# Patient Record
Sex: MALE | Race: WHITE | NOT HISPANIC OR LATINO | Employment: FULL TIME | URBAN - METROPOLITAN AREA
[De-identification: names, ages, dates, MRNs, and addresses within clinical notes are randomized per-mention and may not be internally consistent; named-entity substitution may affect disease eponyms.]

---

## 2017-01-04 ENCOUNTER — OFFICE VISIT (OUTPATIENT)
Dept: MEDICAL GROUP | Facility: CLINIC | Age: 57
End: 2017-01-04
Payer: COMMERCIAL

## 2017-01-04 VITALS
BODY MASS INDEX: 35.21 KG/M2 | HEIGHT: 72 IN | OXYGEN SATURATION: 94 % | SYSTOLIC BLOOD PRESSURE: 120 MMHG | WEIGHT: 260 LBS | TEMPERATURE: 97.5 F | RESPIRATION RATE: 18 BRPM | HEART RATE: 83 BPM | DIASTOLIC BLOOD PRESSURE: 70 MMHG

## 2017-01-04 DIAGNOSIS — B34.9 VIRAL SYNDROME: ICD-10-CM

## 2017-01-04 PROCEDURE — 99213 OFFICE O/P EST LOW 20 MIN: CPT | Performed by: INTERNAL MEDICINE

## 2017-01-04 ASSESSMENT — PATIENT HEALTH QUESTIONNAIRE - PHQ9: CLINICAL INTERPRETATION OF PHQ2 SCORE: 0

## 2017-01-04 NOTE — MR AVS SNAPSHOT
"        Jose Wood Magdaleno   2017 3:00 PM   Office Visit   MRN: [id number]    Department:  [facility]   Dept Phone:  [phone]    Description:  Male : 1960   Provider:  David Martinez M.D.           Allergies as of 2017     Allergen Noted Reactions    Pcn [Penicillins] 2009         Vital Signs     Blood Pressure Pulse Temperature Respirations Height Weight    120/70 mmHg 83 36.4 °C (97.5 °F) 18 1.829 m (6' 0.01\") 117.935 kg (260 lb)    Body Mass Index Oxygen Saturation Smoking Status             35.25 kg/m2 94% Never Smoker          Basic Information     Date Of Birth Sex Race Ethnicity Preferred Language    1960 Male White Non- English      Problem List              ICD-10-CM Priority Class Noted - Resolved    Hypertension I10   Unknown - Present    Situational mixed anxiety and depressive disorder F43.23   2014 - Present    Hyperlipidemia E78.5   10/10/2014 - Present    Skin lesions, generalized L98.9   2015 - Present    Actinic keratosis L57.0   1/15/2015 - Present    Cough R05   3/1/2016 - Present    Vasomotor rhinitis J30.0   2016 - Present      Health Maintenance        Date Due Completion Dates    COLONOSCOPY 2011 ---    IMM INFLUENZA (1) 2015, 2014    IMM DTaP/Tdap/Td Vaccine (2 - Td) 2024            Current Immunizations     Influenza Vaccine Quad Inj (Pf) 2015    Influenza Vaccine Quad Inj (Preserved) 2014    Tdap Vaccine 2014      Below and/or attached are the medications your provider expects you to take. Review all of your home medications and newly ordered medications with your provider and/or pharmacist. Follow medication instructions as directed by your provider and/or pharmacist. Please keep your medication list with you and share with your provider. Update the information when medications are discontinued, doses are changed, or new medications (including over-the-counter products) are " added; and carry medication information at all times in the event of emergency situations     Allergies:  PCN - (reactions not documented)               Medications  Valid as of: January 04, 2017 -  3:30 PM    Generic Name Brand Name Tablet Size Instructions for use    Benzonatate (Cap) TESSALON 100 MG Take 1 Cap by mouth 3 times a day as needed for Cough.        BuPROPion HCl (Tab) WELLBUTRIN 75 MG Take 1 Tab by mouth 2 times a day.        Escitalopram Oxalate (Tab) LEXAPRO 20 MG Take 1 Tab by mouth every day.        Fluorouracil (Cream) EFUDEX 5 % Apply to face and forearm lesion twice per day for two weeks.        Fluticasone Propionate (Suspension) FLONASE 50 MCG/ACT Spray 1 Spray in nose every day.        HydroCHLOROthiazide (Tab) HYDRODIURIL 25 MG Take 1 Tab by mouth every day.        Lisinopril (Tab) PRINIVIL 10 MG Take 1 Tab by mouth every day. PT NEEDS TO ESTABLISH WITH A NEW PCP FOR FUTURE REFILLS 9/9/16        .                 Medicines prescribed today were sent to:     [facility] #[id number] - [place] - [address]    [address] [place] [address]    Phone: [phone] Fax: [phone]    Open 24 Hours?: No      Medication refill instructions:       If your prescription bottle indicates you have medication refills left, it is not necessary to call your provider’s office. Please contact your pharmacy and they will refill your medication.    If your prescription bottle indicates you do not have any refills left, you may request refills at any time through one of the following ways: The online [employer] system (except Urgent Care), by calling your provider’s office, or by asking your pharmacy to contact your provider’s office with a refill request. Medication refills are processed only during regular business hours and may not be available until the next business day. Your provider may request additional information or to have a follow-up visit with you prior to refilling your medication.      *Please Note: Medication refills are assigned a new Rx number when refilled electronically. Your pharmacy may indicate that no refills were authorized even though a new prescription for the same medication is available at the pharmacy. Please request the medicine by name with the pharmacy before contacting your provider for a refill.           MyChart Status: Patient Declined

## 2017-01-05 NOTE — PROGRESS NOTES
"Subjective:  Jose is a 56 y.o. male with the following   Past Medical History   Diagnosis Date   • HTN    • Depression    • Situational mixed anxiety and depressive disorder 1/13/2014   • Chest pain at rest 1/13/2014   • Hyperlipidemia 10/10/2014   • Cough 3/1/2016      Family History   Problem Relation Age of Onset   • Heart Disease Father      The patient is on the following medications:   Current outpatient prescriptions:   •  benzonatate (TESSALON) 100 MG Cap, Take 1 Cap by mouth 3 times a day as needed for Cough., Disp: 60 Cap, Rfl: 0  •  fluticasone (FLONASE) 50 MCG/ACT nasal spray, Spray 1 Spray in nose every day., Disp: 16 g, Rfl: 11  •  escitalopram (LEXAPRO) 20 MG tablet, Take 1 Tab by mouth every day., Disp: 90 Tab, Rfl: 1  •  buPROPion (WELLBUTRIN) 75 MG Tab, Take 1 Tab by mouth 2 times a day., Disp: 180 Tab, Rfl: 1  •  lisinopril (PRINIVIL) 10 MG Tab, Take 1 Tab by mouth every day. PT NEEDS TO ESTABLISH WITH A NEW PCP FOR FUTURE REFILLS 9/9/16, Disp: 90 Tab, Rfl: 1  •  hydrochlorothiazide (HYDRODIURIL) 25 MG Tab, Take 1 Tab by mouth every day., Disp: 90 Tab, Rfl: 1  •  fluorouracil (EFUDEX) 5 % cream, Apply to face and forearm lesion twice per day for two weeks., Disp: 1 Tube, Rfl: 0    HPI; patient is here today complaining of nasal congestion, rhinorrhea, dry cough and generalized ache for several days,  denies having shortness of breath, fever or chills  ROS:  See HPI    Blood pressure 120/70, pulse 83, temperature 36.4 °C (97.5 °F), resp. rate 18, height 1.829 m (6' 0.01\"), weight 117.935 kg (260 lb), SpO2 94 %.on RA  Objective:  Patient is well appearing and in no acute distress.  Pharynx is clear.  Neck is soft and supple with no cervical or supraclavicular lymphadenopathy, thyromegaly or masses, no JVD.  Lungs clear to auscultation bilaterally with normal respiratory effort. Abdomen soft, non-tender on palpation,not distended. Heart regular rate and rhythm without murmur. Extremities without " any clubbing, cyanosis, or edema.    Assessment and Plan:  1. Viral syndrome; nasal congestion, rhinorrhea/ postnasal drip and dry cough, generalized ache. Lungs are clear to auscultation, pulse ox 94% on room air, patient is not in apparent distress.      Patient is advised on preventive and supportive care regarding current symptoms, OTC alternative therapies, antihistamines, nasal sprays,... If symptoms not improved or worsen return for evaluation.      Please note that this dictation was created using voice recognition software. I have worked with consultants from the vendor as well as technical experts from [facility] to optimize the interface. I have made every reasonable attempt to correct obvious errors, but I expect that there are errors of grammar and possibly content that I did not discover before finalizing the note.

## 2017-04-20 ENCOUNTER — OFFICE VISIT (OUTPATIENT)
Dept: URGENT CARE | Facility: PHYSICIAN GROUP | Age: 57
End: 2017-04-20

## 2017-04-20 VITALS
DIASTOLIC BLOOD PRESSURE: 88 MMHG | HEART RATE: 72 BPM | TEMPERATURE: 97.1 F | WEIGHT: 258 LBS | SYSTOLIC BLOOD PRESSURE: 136 MMHG | OXYGEN SATURATION: 95 % | HEIGHT: 73 IN | BODY MASS INDEX: 34.19 KG/M2

## 2017-04-20 DIAGNOSIS — Z02.89 ENCOUNTER FOR EXAMINATION REQUIRED BY DEPARTMENT OF TRANSPORTATION (DOT): ICD-10-CM

## 2017-04-20 PROCEDURE — 7100 PR DOT PHYSICAL: Performed by: EMERGENCY MEDICINE

## 2017-04-21 NOTE — PROGRESS NOTES
Here for [facility] CMV medical examination. Form completed, 2 year certificate. Patient advised of positive heme in urine test, advised follow-up with PCP when available.

## 2017-06-12 RX ORDER — LISINOPRIL 10 MG/1
TABLET ORAL
Qty: 90 TAB | Refills: 3 | Status: SHIPPED | OUTPATIENT
Start: 2017-06-12 | End: 2018-06-29 | Stop reason: SDUPTHER

## 2017-06-12 NOTE — TELEPHONE ENCOUNTER
Received refill request for lisinopril.  Pt was seen in clinic recently and is taking this medication for hypertension. Refill sent to pharmacy    Jabari Kramer M.D.

## 2017-06-19 RX ORDER — HYDROCHLOROTHIAZIDE 25 MG/1
TABLET ORAL
Qty: 90 TAB | Refills: 3 | Status: SHIPPED | OUTPATIENT
Start: 2017-06-19 | End: 2018-04-04

## 2017-10-09 ENCOUNTER — OFFICE VISIT (OUTPATIENT)
Dept: MEDICAL GROUP | Facility: PHYSICIAN GROUP | Age: 57
End: 2017-10-09
Payer: COMMERCIAL

## 2017-10-09 VITALS
HEIGHT: 73 IN | OXYGEN SATURATION: 95 % | TEMPERATURE: 98.7 F | HEART RATE: 80 BPM | WEIGHT: 255 LBS | BODY MASS INDEX: 33.8 KG/M2 | SYSTOLIC BLOOD PRESSURE: 120 MMHG | RESPIRATION RATE: 16 BRPM | DIASTOLIC BLOOD PRESSURE: 78 MMHG

## 2017-10-09 DIAGNOSIS — F41.0 SEVERE ANXIETY WITH PANIC: ICD-10-CM

## 2017-10-09 DIAGNOSIS — E66.9 OBESITY (BMI 35.0-39.9 WITHOUT COMORBIDITY): ICD-10-CM

## 2017-10-09 DIAGNOSIS — E55.9 VITAMIN D DEFICIENCY: ICD-10-CM

## 2017-10-09 DIAGNOSIS — R07.89 OTHER CHEST PAIN: ICD-10-CM

## 2017-10-09 PROCEDURE — 93000 ELECTROCARDIOGRAM COMPLETE: CPT | Performed by: NURSE PRACTITIONER

## 2017-10-09 PROCEDURE — 99214 OFFICE O/P EST MOD 30 MIN: CPT | Mod: 25 | Performed by: NURSE PRACTITIONER

## 2017-10-09 RX ORDER — ERGOCALCIFEROL 1.25 MG/1
50000 CAPSULE ORAL
Refills: 2 | COMMUNITY
Start: 2017-09-13

## 2017-10-09 RX ORDER — LORAZEPAM 1 MG/1
1 TABLET ORAL EVERY 4 HOURS PRN
COMMUNITY
End: 2017-11-13 | Stop reason: SDUPTHER

## 2017-10-09 RX ORDER — ESCITALOPRAM OXALATE 20 MG/1
20 TABLET ORAL DAILY
Qty: 30 TAB | Refills: 0 | Status: SHIPPED | OUTPATIENT
Start: 2017-10-09 | End: 2017-11-05 | Stop reason: SDUPTHER

## 2017-10-09 ASSESSMENT — PAIN SCALES - GENERAL: PAINLEVEL: NO PAIN

## 2017-10-09 NOTE — ASSESSMENT & PLAN NOTE
Chronic in nature. Worsening. Patient continues to take Ativan 1 mg as needed for this condition and states he does not take this frequently as he has been told that it is habit forming. Patient is also taking sertraline 50 mg daily but states he isn't taking it for approximately 8 weeks without resolution or improvement of symptoms. Patient states he did take Lexapro in the past with good results. Patient symptoms mainly include atypical chest pain, shortness of breath, panic patient states that he will become incredibly overwhelmed, irritated, snapping at people, has difficulty sleeping. 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 Patient has bloodshot eyes and appears exhausted. 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

## 2017-10-09 NOTE — PROGRESS NOTES
Chief Complaint   Patient presents with   • Chest Pain     x 2 weeks R side    • Anxiety       HISTORY OF PRESENT ILLNESS: Patient is a 57 y.o. male established patient who presents today toDiscuss chest pain, anxiety.    Other chest pain  This ia a new problem over the last 2 weeks. States that he having an aching pain in his right chest. States that it feels like an aching pressure. States that it will get worse with deep breaths. Pain will sometimes last all day dull ache, and radiates up to the shoulder, has improved. 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 No numbness, tingling, no increased SOB.  States he is having issues with panic episodes. Patient states he does have some burning in epigastric area after eating. States that he is currently taking zoloft and ativan. Previously he was on lexapro, which did seem to work better. Most severe in morning when he wakes up. Drinks alcohol rarely.     Severe anxiety with panic  Chronic in nature. Worsening. Patient continues to take Ativan 1 mg as needed for this condition and states he does not take this frequently as he has been told that it is habit forming. Patient is also taking sertraline 50 mg daily but states he isn't taking it for approximately 8 weeks without resolution or improvement of symptoms. Patient states he did take Lexapro in the past with good results. Patient symptoms mainly include atypical chest pain, shortness of breath, panic patient states that he will become incredibly overwhelmed, irritated, snapping at people, has difficulty sleeping. 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 Patient has bloodshot eyes and appears exhausted. 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      Patient Active Problem List    Diagnosis Date Noted   • Other chest pain 10/09/2017   • Vitamin D deficiency 10/09/2017   • Obesity (BMI 35.0-39.9 without comorbidity) ([facility]) 10/09/2017   • Vasomotor rhinitis 2016   • Cough 2016   • Actinic keratosis 01/15/2015   • Skin lesions, generalized 2015   • Hyperlipidemia 10/10/2014   • Severe anxiety with panic 2014   • Hypertension        Allergies:Pcn [penicillins]    Current Outpatient Prescriptions   Medication Sig Dispense Refill   • lorazepam (ATIVAN) 1 MG Tab Take 1 mg by mouth every four hours as needed for Anxiety.     • escitalopram (LEXAPRO) 20 MG tablet Take 1 Tab by mouth every day. 30 Tab 0   • hydrochlorothiazide (HYDRODIURIL) 25 MG Tab TAKE 1 TAB BY MOUTH EVERY DAY. 90 Tab 3   • lisinopril (PRINIVIL) 10 MG Tab TAKE 1 TABLET BY MOUTH EVERY DAY 90 Tab 3   • vitamin D, Ergocalciferol, (DRISDOL) 68043 units Cap capsule Take 50,000 Units by mouth every 7 days.  2   • fluorouracil (EFUDEX) 5 % cream Apply to face and forearm lesion twice per day for two weeks. 1 Tube 0     No current facility-administered medications for this visit.        Social History   Substance Use Topics   • Smoking status: Never Smoker   • Smokeless tobacco: Never Used   • Alcohol use 1.0 oz/week     1 Glasses of wine, 1 Cans of beer per week      Comment: Occasionally her       Family Status   Relation Status   • Mother    • Father      Family History   Problem Relation Age of Onset   • Heart Disease Father        Review of Systems:   Constitutional:  Negative for fever, chills, weight loss and malaise/fatigue.   HEENT:  Negative for ear pain, nosebleeds, congestion, sore throat and neck pain.    Eyes:  Negative for blurred vision.   Respiratory:  Negative for cough, sputum production, shortness of breath and wheezing.    Cardiovascular:  Negative for chest pain, palpitations, orthopnea and leg swelling.  "  Gastrointestinal:  Negative for heartburn, nausea, vomiting and abdominal pain.   Genitourinary:  Negative for dysuria, urgency and frequency.   Musculoskeletal:  Negative for myalgias, back pain and joint pain.   Skin:  Negative for rash and itching.   Neurological:  Negative for dizziness, tingling, tremors, sensory change, focal weakness and headaches.   Endo/Heme/Allergies:  Does not bruise/bleed easily.   Psychiatric/Behavioral:  Negative for depression, suicidal ideas and memory loss.  The patient is nervous/anxious and does not have insomnia.    All other systems reviewed and are negative except as in HPI.    Exam:  Blood pressure 120/78, pulse 80, temperature 37.1 °C (98.7 °F), resp. rate 16, height 1.854 m (6' 1\"), weight 115.7 kg (255 lb), SpO2 95 %.  General:  Normal appearing. No distress.  HEENT:  Normocephalic. Eyes bloodshot conjunctiva clear lids without ptosis, pupils equal and reactive to light accommodation, ears normal shape and contour, canals are clear bilaterally, tympanic membranes are benign, nasal mucosa benign, oropharynx is without erythema, edema or exudates.   Neck:  Supple without JVD or bruit. Thyroid is not enlarged.  Pulmonary:  Clear to ausculation.  Normal effort. No rales, ronchi, or wheezing.  Cardiovascular:  Regular rate and rhythm without murmur. Carotid and radial pulses are intact and equal bilaterally.  Abdomen:  Soft, nontender, nondistended. Normal bowel sounds. Liver and spleen are not palpable  Neurologic:  Grossly nonfocal  Lymph:  No cervical, supraclavicular or axillary lymph nodes are palpable  Skin:  Warm and dry.  No obvious lesions.  Musculoskeletal:  Normal gait. No extremity cyanosis, clubbing, or edema.  Psych:  Normal mood and affect. Alert and oriented x3. Judgment and insight is normal.    EKG Interpretation   Interpreted by me   Rhythm: normal sinus   Rate: normal   Conduction: normal   ST Segments: no acute change   T Waves: no acute change   Q Waves: " none   Clinical Impression: no acute changes and normal EKG    PLAN:    1. Other chest pain  Pain is likely related to anxiety.  - EKG - Clinic Performed    2. Vitamin D deficiency  Will attempt to obtain recent lab results.    3. Obesity (BMI 35.0-39.9 without comorbidity)  Counseled patient regarding healthy diet and exercise.  - Patient identified as having weight management issue.  Appropriate orders and counseling given.    4. Severe anxiety with panic  Patient will stop Zoloft and start Lexapro.  - escitalopram (LEXAPRO) 20 MG tablet; Take 1 Tab by mouth every day.  Dispense: 30 Tab; Refill: 0    Follow-up in one month. Patient is encouraged to be seen in the emergency room for chest pain, palpitations, shortness of breath, dizziness, severe abdominal pain or other concerning symptoms.    Please note that this dictation was created using voice recognition software. I have made every reasonable attempt to correct obvious errors, but I expect that there are errors of grammar and possibly content that I did not discover before finalizing the note.    Assessment/Plan:  1. Other chest pain  EKG - Clinic Performed   2. Vitamin D deficiency     3. Obesity (BMI 35.0-39.9 without comorbidity)  Patient identified as having weight management issue.  Appropriate orders and counseling given.   4. Severe anxiety with panic  escitalopram (LEXAPRO) 20 MG tablet          I have placed the below orders and discussed them with an approved delegating provider. The MA is performing the below orders under the direction of Dr. Falcon.

## 2017-10-09 NOTE — ASSESSMENT & PLAN NOTE
This ia a new problem over the last 2 weeks. States that he having an aching pain in his right chest. States that it feels like an aching pressure. States that it will get worse with deep breaths. Pain will sometimes last all day dull ache, and radiates up to the shoulder, has improved. 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 No numbness, tingling, no increased SOB.  States he is having issues with panic episodes. Patient states he does have some burning in epigastric area after eating. States that he is currently taking zoloft and ativan. Previously he was on lexapro, which did seem to work better. Most severe in morning when he wakes up. Drinks alcohol rarely.

## 2017-11-05 DIAGNOSIS — F41.0 SEVERE ANXIETY WITH PANIC: ICD-10-CM

## 2017-11-06 RX ORDER — ESCITALOPRAM OXALATE 20 MG/1
TABLET ORAL
Qty: 30 TAB | Refills: 5 | Status: SHIPPED | OUTPATIENT
Start: 2017-11-06 | End: 2018-05-07 | Stop reason: SDUPTHER

## 2017-11-13 ENCOUNTER — OFFICE VISIT (OUTPATIENT)
Dept: MEDICAL GROUP | Facility: PHYSICIAN GROUP | Age: 57
End: 2017-11-13
Payer: COMMERCIAL

## 2017-11-13 VITALS
OXYGEN SATURATION: 98 % | DIASTOLIC BLOOD PRESSURE: 82 MMHG | BODY MASS INDEX: 33.72 KG/M2 | SYSTOLIC BLOOD PRESSURE: 120 MMHG | RESPIRATION RATE: 16 BRPM | TEMPERATURE: 98.7 F | HEIGHT: 73 IN | WEIGHT: 254.4 LBS | HEART RATE: 79 BPM

## 2017-11-13 DIAGNOSIS — F41.9 ANXIETY AND DEPRESSION: ICD-10-CM

## 2017-11-13 DIAGNOSIS — E66.9 OBESITY (BMI 30-39.9): ICD-10-CM

## 2017-11-13 DIAGNOSIS — I10 ESSENTIAL HYPERTENSION: ICD-10-CM

## 2017-11-13 DIAGNOSIS — F32.A ANXIETY AND DEPRESSION: ICD-10-CM

## 2017-11-13 DIAGNOSIS — Z23 INFLUENZA VACCINE NEEDED: ICD-10-CM

## 2017-11-13 PROCEDURE — 99214 OFFICE O/P EST MOD 30 MIN: CPT | Mod: 25 | Performed by: INTERNAL MEDICINE

## 2017-11-13 PROCEDURE — 90686 IIV4 VACC NO PRSV 0.5 ML IM: CPT | Performed by: INTERNAL MEDICINE

## 2017-11-13 PROCEDURE — 90471 IMMUNIZATION ADMIN: CPT | Performed by: INTERNAL MEDICINE

## 2017-11-13 RX ORDER — LORAZEPAM 1 MG/1
1 TABLET ORAL
Qty: 25 TAB | Refills: 1 | Status: SHIPPED | OUTPATIENT
Start: 2017-11-13 | End: 2018-04-04

## 2017-11-14 NOTE — PROGRESS NOTES
"Subjective:  Jose is a 57 y.o. male with the following   Past Medical History:   Diagnosis Date   • Chest pain at rest 1/13/2014   • Cough 3/1/2016   • Depression    • HTN    • Hyperlipidemia 10/10/2014   • Situational mixed anxiety and depressive disorder 1/13/2014      Family History   Problem Relation Age of Onset   • Heart Disease Father      The patient is on the following medications:   Current Outpatient Prescriptions:   •  escitalopram (LEXAPRO) 20 MG tablet, TAKE 1 TABLET BY MOUTH EVERY DAY., Disp: 30 Tab, Rfl: 5  •  lorazepam (ATIVAN) 1 MG Tab, Take 1 mg by mouth every four hours as needed for Anxiety., Disp: , Rfl:   •  hydrochlorothiazide (HYDRODIURIL) 25 MG Tab, TAKE 1 TAB BY MOUTH EVERY DAY., Disp: 90 Tab, Rfl: 3  •  lisinopril (PRINIVIL) 10 MG Tab, TAKE 1 TABLET BY MOUTH EVERY DAY, Disp: 90 Tab, Rfl: 3  •  vitamin D, Ergocalciferol, (DRISDOL) 23782 units Cap capsule, Take 50,000 Units by mouth every 7 days., Disp: , Rfl: 2    HPI; Patient is here today for follow-up visit after being evaluated for  anxiety and depressive symptoms, stating that taking Lexapro and lorazepam has significantly improved his condition, denies any suicidal ideation. Patient is  also on hydrochlorothiazide and lisinopril for hypertension and vitamin D supplements for vitamin D deficiency, denies having dry cough, chest pain or shortness of breath, urinary frequency or back pain.   ROS:  See HPI    Blood pressure 120/82, pulse 79, temperature 37.1 °C (98.7 °F), resp. rate 16, height 1.854 m (6' 1\"), weight 115.4 kg (254 lb 6.4 oz), SpO2 98 %.on RA  Objective:  Patient is well appearing and in no acute distress.  Pharynx is clear.  Neck is soft and supple with no cervical or supraclavicular lymphadenopathy, thyromegaly or masses, no JVD.  Lungs clear to auscultation bilaterally with normal respiratory effort. Abdomen soft, non-tender on palpation,not distended. Heart regular rate and rhythm without murmur. Extremities without " any clubbing, cyanosis, or edema.    Assessment and Plan:  1. HTN; BP is  stable on lisinopril 10 mg and hydrochlorothiazide 25 mg daily.       2. Anxiety/ depression; it has responded to Lexapro 20 mrem daily and lorazepam 1 mg as needed.      3. Increased BMI      4. Request of influenza vaccine      Patient is advised on preventive and supportive care, diet and lifestyle modification, daily walking ,exercise and weight loss, refilled medication. Given influenza vaccine per patient's request.       Please note that this dictation was created using voice recognition software. I have worked with consultants from the vendor as well as technical experts from [facility] to optimize the interface. I have made every reasonable attempt to correct obvious errors, but I expect that there are errors of grammar and possibly content that I did not discover before finalizing the note.

## 2018-02-21 ENCOUNTER — TELEPHONE (OUTPATIENT)
Dept: MEDICAL GROUP | Facility: PHYSICIAN GROUP | Age: 58
End: 2018-02-21

## 2018-02-21 NOTE — TELEPHONE ENCOUNTER
Phone Number Called: [phone]    Message: LVM informing patient an appt is required for this refill and he may call central scheduling at [phone] to make an appt for refill and schedule a new patient appt with new pcp.     Left Message for patient to call back: yes

## 2018-02-21 NOTE — TELEPHONE ENCOUNTER
VOICEMAIL  1. Caller Name: Jose                Call Back Number: [phone] (cell)    2. Message: Patient LVM requesting refill for Ativan.     3. Patient approves office to leave a detailed voicemail/MyChart message: yes

## 2018-04-04 ENCOUNTER — OFFICE VISIT (OUTPATIENT)
Dept: MEDICAL GROUP | Facility: MEDICAL CENTER | Age: 58
End: 2018-04-04
Payer: COMMERCIAL

## 2018-04-04 VITALS
HEART RATE: 89 BPM | SYSTOLIC BLOOD PRESSURE: 128 MMHG | HEIGHT: 72 IN | DIASTOLIC BLOOD PRESSURE: 78 MMHG | TEMPERATURE: 99.2 F | RESPIRATION RATE: 20 BRPM | WEIGHT: 260.14 LBS | BODY MASS INDEX: 35.24 KG/M2 | OXYGEN SATURATION: 90 %

## 2018-04-04 DIAGNOSIS — Z12.11 COLON CANCER SCREENING: ICD-10-CM

## 2018-04-04 DIAGNOSIS — F41.0 SEVERE ANXIETY WITH PANIC: ICD-10-CM

## 2018-04-04 DIAGNOSIS — Z12.11 SCREENING FOR COLON CANCER: ICD-10-CM

## 2018-04-04 DIAGNOSIS — E78.1 PURE HYPERGLYCERIDEMIA: ICD-10-CM

## 2018-04-04 DIAGNOSIS — I10 ESSENTIAL HYPERTENSION: ICD-10-CM

## 2018-04-04 PROBLEM — R07.89 OTHER CHEST PAIN: Status: RESOLVED | Noted: 2017-10-09 | Resolved: 2018-04-04

## 2018-04-04 PROCEDURE — 99214 OFFICE O/P EST MOD 30 MIN: CPT | Mod: 25 | Performed by: FAMILY MEDICINE

## 2018-04-04 RX ORDER — PROPRANOLOL HYDROCHLORIDE 10 MG/1
40 TABLET ORAL 2 TIMES DAILY
Qty: 180 TAB | Refills: 3 | Status: SHIPPED | OUTPATIENT
Start: 2018-04-04 | End: 2018-04-04 | Stop reason: SDUPTHER

## 2018-04-04 RX ORDER — PROPRANOLOL HYDROCHLORIDE 40 MG/1
40 TABLET ORAL 2 TIMES DAILY
Qty: 180 TAB | Refills: 3 | Status: SHIPPED | OUTPATIENT
Start: 2018-04-04 | End: 2019-04-28 | Stop reason: SDUPTHER

## 2018-04-04 RX ORDER — HYDROXYZINE HYDROCHLORIDE 25 MG/1
25 TABLET, FILM COATED ORAL 3 TIMES DAILY PRN
Qty: 30 TAB | Refills: 0 | Status: SHIPPED | OUTPATIENT
Start: 2018-04-04 | End: 2018-04-04

## 2018-04-04 RX ORDER — LORAZEPAM 1 MG/1
1 TABLET ORAL
Qty: 30 TAB | Refills: 1 | Status: SHIPPED | OUTPATIENT
Start: 2018-04-04 | End: 2018-07-03

## 2018-04-04 RX ORDER — BUPROPION HYDROCHLORIDE 150 MG/1
150 TABLET ORAL EVERY MORNING
Qty: 90 TAB | Refills: 3 | Status: SHIPPED | OUTPATIENT
Start: 2018-04-04 | End: 2018-06-29 | Stop reason: SDUPTHER

## 2018-04-04 ASSESSMENT — PATIENT HEALTH QUESTIONNAIRE - PHQ9: CLINICAL INTERPRETATION OF PHQ2 SCORE: 0

## 2018-04-04 NOTE — ASSESSMENT & PLAN NOTE
Well controlled on lisinipril and HCTZ  Will sub HCTZ with propranolol for better control    Follow up 2 mo labs prior

## 2018-04-04 NOTE — PROGRESS NOTES
This medical record contains text that has been entered with the assistance of computer voice recognition and dictation software.  Therefore, it may contain unintended errors in text, spelling, punctuation, or grammar        Chief Complaint   Patient presents with   • Establish Care       Jose Dolan is a 57 y.o. male here evaluation and management of: est care anxiety htn     HPI:     He is 58 yo M he has h/o anxiety and htn   htn is well controlled but his anxiety is poorly controlled    airs break and allignment family compaany   started by his parents and they passed  he has a son and daughter in town   daughter wants to be dental hygenist   4th gen [employer]        Current Outpatient Prescriptions   Medication Sig Dispense Refill   • LORazepam (ATIVAN) 1 MG Tab Take 1 Tab by mouth 1 time daily as needed for Anxiety for up to 90 days. 30 Tab 1   • buPROPion (WELLBUTRIN XL) 150 MG XL tablet Take 1 Tab by mouth every morning. 90 Tab 3   • propranolol (INDERAL) 40 MG Tab Take 1 Tab by mouth 2 times a day. 180 Tab 3   • escitalopram (LEXAPRO) 20 MG tablet TAKE 1 TABLET BY MOUTH EVERY DAY. 30 Tab 5   • lisinopril (PRINIVIL) 10 MG Tab TAKE 1 TABLET BY MOUTH EVERY DAY 90 Tab 3   • vitamin D, Ergocalciferol, (DRISDOL) 45366 units Cap capsule Take 50,000 Units by mouth every 7 days.  2     No current facility-administered medications for this visit.      Patient Active Problem List    Diagnosis Date Noted   • Colon cancer screening 04/04/2018   • Vitamin D deficiency 10/09/2017   • Obesity (BMI 35.0-39.9 without comorbidity) (HCC) 10/09/2017   • Vasomotor rhinitis 11/04/2016   • Actinic keratosis 01/15/2015   • Skin lesions, generalized 01/13/2015   • Hyperlipidemia 10/10/2014   • Severe anxiety with panic 01/13/2014   • Hypertension      Past Surgical History:   Procedure Laterality Date   • TONSILLECTOMY        Social History   Substance Use Topics   • Smoking status: Never Smoker   • Smokeless tobacco: Never  Used   • Alcohol use 1.0 oz/week     1 Glasses of wine, 1 Cans of beer per week      Comment: Occasionally her     Family History   Problem Relation Age of Onset   • Heart Disease Father            ROS  + anxiety  No SI/HI  all review of system completed and negative except for those listed above     Objective:     Blood pressure 128/78, pulse 89, temperature 37.3 °C (99.2 °F), resp. rate 20, height 1.829 m (6'), weight 118 kg (260 lb 2.3 oz), SpO2 90 %. Body mass index is 35.28 kg/m².  Physical Exam:    Constitutional: Alert, no distress.  Skin: Warm, dry, good turgor  Eye: Equal, round and reactive, conjunctiva clear, lids normal.  ENMT: Lips without lesions, good dentition, oropharynx clear.  Neck: Trachea midline, no masses, no thyromegaly. No cervical or supraclavicular lymphadenopathy.  Respiratory: Unlabored respiratory effort, lungs clear to auscultation, no wheezes, no ronchi.  Cardiovascular: Normal S1, S2, no murmur, no edema.  Abdomen: Soft, non-tender, no masses, no hepatosplenomegaly.  Psych: Alert and oriented x3, normal affect and mood.          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  Takes lexapro  Will augment with wellbutrin   Will add on propranolol    He is requesting ativan for prn use  Risk assessed low    checked  Risk benefit side effect alternatives precautions discussed  Consent signed  Follow up for next controlled substance refill                     Relevant Medications    LORazepam (ATIVAN) 1 MG Tab    buPROPion  (WELLBUTRIN XL) 150 MG XL tablet    Hyperlipidemia     Due for labs               Relevant Medications    propranolol (INDERAL) 40 MG Tab    Colon cancer screening    Relevant Orders    OCCULT BLOOD FECES IMMUNOASSAY      Other Visit Diagnoses     Screening for colon cancer        Relevant Orders    REFERRAL TO GI FOR COLONOSCOPY                Instructed to follow up if symptoms worsen or fail to improve, ER/UC precautions discussed as well    Kaylene Nova MD  [facility], Family [address]   [place] [address]  Phone: [phone]

## 2018-04-04 NOTE — ASSESSMENT & PLAN NOTE
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

## 2018-05-07 DIAGNOSIS — F41.0 SEVERE ANXIETY WITH PANIC: ICD-10-CM

## 2018-05-07 RX ORDER — ESCITALOPRAM OXALATE 20 MG/1
TABLET ORAL
Qty: 30 TAB | Refills: 5 | Status: SHIPPED | OUTPATIENT
Start: 2018-05-07 | End: 2018-11-05 | Stop reason: SDUPTHER

## 2018-05-11 ENCOUNTER — TELEPHONE (OUTPATIENT)
Dept: MEDICAL GROUP | Facility: MEDICAL CENTER | Age: 58
End: 2018-05-11

## 2018-05-11 NOTE — TELEPHONE ENCOUNTER
"Per Dr. Nova \"Great   Any change in mental health med usually will take 2 mo to go into effect Have him follow up with me telemed in 1 mo\"  Left pt a mess to call back x 2 messages now.   "

## 2018-05-11 NOTE — TELEPHONE ENCOUNTER
Pt left a mess asking for a refill on lexapro 20mg tab and wanted to ask how long before he begins to tell that the medication is helping.   -----------------------------------------------------------------------------------  Dr. 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  Dr. Nova, pt states that you did mention for him to give it a couple of months and its only been 1mo--please advise if ok for me to tell pt just to keep taking his meds and give it another mo or two??

## 2018-06-23 ENCOUNTER — HOSPITAL ENCOUNTER (OUTPATIENT)
Dept: LAB | Facility: MEDICAL CENTER | Age: 58
End: 2018-06-23
Attending: FAMILY MEDICINE
Payer: COMMERCIAL

## 2018-06-23 DIAGNOSIS — I10 ESSENTIAL HYPERTENSION: ICD-10-CM

## 2018-06-23 LAB
ANION GAP SERPL CALC-SCNC: 8 MMOL/L (ref 0–11.9)
BUN SERPL-MCNC: 13 MG/DL (ref 8–22)
CALCIUM SERPL-MCNC: 8.9 MG/DL (ref 8.5–10.5)
CHLORIDE SERPL-SCNC: 109 MMOL/L (ref 96–112)
CHOLEST SERPL-MCNC: 165 MG/DL (ref 100–199)
CO2 SERPL-SCNC: 24 MMOL/L (ref 20–33)
CREAT SERPL-MCNC: 0.83 MG/DL (ref 0.5–1.4)
GLUCOSE SERPL-MCNC: 99 MG/DL (ref 65–99)
HDLC SERPL-MCNC: 37 MG/DL
POTASSIUM SERPL-SCNC: 4.1 MMOL/L (ref 3.6–5.5)
SODIUM SERPL-SCNC: 141 MMOL/L (ref 135–145)

## 2018-06-23 PROCEDURE — 83721 ASSAY OF BLOOD LIPOPROTEIN: CPT

## 2018-06-23 PROCEDURE — 82465 ASSAY BLD/SERUM CHOLESTEROL: CPT

## 2018-06-23 PROCEDURE — 80048 BASIC METABOLIC PNL TOTAL CA: CPT

## 2018-06-23 PROCEDURE — 83718 ASSAY OF LIPOPROTEIN: CPT

## 2018-06-23 PROCEDURE — 36415 COLL VENOUS BLD VENIPUNCTURE: CPT

## 2018-06-25 LAB — LDLC SERPL-MCNC: 136 MG/DL (ref 0–129)

## 2018-06-29 ENCOUNTER — OFFICE VISIT (OUTPATIENT)
Dept: MEDICAL GROUP | Facility: MEDICAL CENTER | Age: 58
End: 2018-06-29
Payer: COMMERCIAL

## 2018-06-29 VITALS
TEMPERATURE: 97.9 F | OXYGEN SATURATION: 95 % | RESPIRATION RATE: 20 BRPM | HEART RATE: 72 BPM | HEIGHT: 72 IN | SYSTOLIC BLOOD PRESSURE: 122 MMHG | DIASTOLIC BLOOD PRESSURE: 78 MMHG | WEIGHT: 256.62 LBS | BODY MASS INDEX: 34.76 KG/M2

## 2018-06-29 DIAGNOSIS — I10 ESSENTIAL HYPERTENSION: ICD-10-CM
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
## 2018-06-29 DIAGNOSIS — R53.83 FATIGUE, UNSPECIFIED TYPE: ICD-10-CM

## 2018-06-29 DIAGNOSIS — E78.1 PURE HYPERGLYCERIDEMIA: ICD-10-CM

## 2018-06-29 DIAGNOSIS — F41.0 SEVERE ANXIETY WITH PANIC: ICD-10-CM
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
## 2018-06-29 PROCEDURE — 99214 OFFICE O/P EST MOD 30 MIN: CPT | Performed by: FAMILY MEDICINE

## 2018-06-29 RX ORDER — BUPROPION HYDROCHLORIDE 300 MG/1
300 TABLET ORAL EVERY MORNING
Qty: 90 TAB | Refills: 3 | Status: SHIPPED | OUTPATIENT
Start: 2018-06-29 | End: 2018-10-10 | Stop reason: SDUPTHER

## 2018-06-29 RX ORDER — LISINOPRIL 10 MG/1
10 TABLET ORAL
Qty: 90 TAB | Refills: 3 | Status: SHIPPED | OUTPATIENT
Start: 2018-06-29 | End: 2019-06-05 | Stop reason: SDUPTHER

## 2018-06-29 NOTE — PROGRESS NOTES
This medical record contains text that has been entered with the assistance of computer voice recognition and dictation software.  Therefore, it may contain unintended errors in text, spelling, punctuation, or grammar        Chief Complaint   Patient presents with   • Depression     patient is here for a follow up       Jose Dolan is a 57 y.o. male here evaluation and management of: new issue fatigue, anxiety htn     HPI:     He is 58 yo M he has h/o anxiety and htn   htn is well controlled but his anxiety is poorly controlled    airs break and allignment family compaany   started by his parents and they passed  he has a son and daughter in town   daughter wants to be dental hygenist   4th gen [employer]        Current Outpatient Prescriptions   Medication Sig Dispense Refill   • buPROPion (WELLBUTRIN XL) 300 MG XL tablet Take 1 Tab by mouth every morning. 90 Tab 3   • lisinopril (PRINIVIL) 10 MG Tab Take 1 Tab by mouth every day. 90 Tab 3   • escitalopram (LEXAPRO) 20 MG tablet TAKE 1 TABLET BY MOUTH EVERY DAY. 30 Tab 5   • LORazepam (ATIVAN) 1 MG Tab Take 1 Tab by mouth 1 time daily as needed for Anxiety for up to 90 days. 30 Tab 1   • propranolol (INDERAL) 40 MG Tab Take 1 Tab by mouth 2 times a day. 180 Tab 3   • vitamin D, Ergocalciferol, (DRISDOL) 34193 units Cap capsule Take 50,000 Units by mouth every 7 days.  2     No current facility-administered medications for this visit.      Patient Active Problem List    Diagnosis Date Noted   • Tick bite 06/29/2018   • Fatigue 06/29/2018   • Colon cancer screening 04/04/2018   • Vitamin D deficiency 10/09/2017   • Obesity (BMI 35.0-39.9 without comorbidity) ([facility]) 10/09/2017   • Vasomotor rhinitis 11/04/2016   • Actinic keratosis 01/15/2015   • Skin lesions, generalized 01/13/2015   • Hyperlipidemia 10/10/2014   • Severe anxiety with panic 01/13/2014   • Hypertension      Past Surgical History:   Procedure Laterality Date   • TONSILLECTOMY        Social History    Substance Use Topics   • Smoking status: Never Smoker   • Smokeless tobacco: Never Used   • Alcohol use 1.0 oz/week     1 Glasses of wine, 1 Cans of beer per week      Comment: Occasionally her     Family History   Problem Relation Age of Onset   • Heart Disease Father            ROS  + anxiety  No SI/HI  all review of system completed and negative except for those listed above     Objective:     Blood pressure 122/78, pulse 72, temperature 36.6 °C (97.9 °F), resp. rate 20, height 1.829 m (6'), weight 116.4 kg (256 lb 9.9 oz), SpO2 95 %. Body mass index is 34.8 kg/m².  Physical Exam:    Constitutional: Alert, no distress.  Skin: Warm, dry, good turgor  Eye: Equal, round and reactive, conjunctiva clear, lids normal.  ENMT: Lips without lesions, good dentition, oropharynx clear.  Neck: Trachea midline, no masses, no thyromegaly. No cervical or supraclavicular lymphadenopathy.  Respiratory: Unlabored respiratory effort, lungs clear to auscultation, no wheezes, no ronchi.  Cardiovascular: Normal S1, S2, no murmur, no edema.  Abdomen: Soft, non-tender, no masses, no hepatosplenomegaly.  Psych: Alert and oriented x3, normal affect and mood.    No target lesions  No umbilical hernia        Assessment and Plan:   The following treatment plan was discussed        Problem List Items Addressed This Visit     Hypertension     Continue lisinopril and BB               Relevant Medications    lisinopril (PRINIVIL) 10 MG Tab    Severe anxiety with panic     Continue lexapro and augmenting with wellbutrin   Increase wellbutrin to 300  He is not taking ativan     Follow up 2 mo   Over 25 minutes spent with patient face to face, greater than 50% time spent with plan/coordination of care regarding that which is discussed in the HPI and A&P           Relevant Medications    buPROPion (WELLBUTRIN XL) 300 MG XL tablet    Hyperlipidemia     Diet controlled                Relevant Medications    lisinopril (PRINIVIL) 10 MG Tab    Tick  bite    Relevant Orders    LYME, IGM, EARLY TEST/REFLEX    TSH WITH REFLEX TO FT4    Fatigue     He had a handful of tickbites in [facility] and has been tired since  Will test for lyme                           Instructed to follow up if symptoms worsen or fail to improve, ER/UC precautions discussed as well    Kaylene Nova MD  [facility], Family Medicine   [address]   [place] [address]  Phone: [phone]

## 2018-06-29 NOTE — ASSESSMENT & PLAN NOTE
He had a handful of tickbites in [facility] and has been tired since  Will test for lyme

## 2018-06-29 NOTE — ASSESSMENT & PLAN NOTE
Continue lexapro and augmenting with wellbutrin   Increase wellbutrin to 300  He is not taking ativan     Follow up 2 mo   Over 25 minutes spent with patient face to face, greater than 50% time spent with plan/coordination of care regarding that which is discussed in the HPI and A&P

## 2018-07-25 RX ORDER — HYDROCHLOROTHIAZIDE 25 MG/1
25 TABLET ORAL
Qty: 90 TAB | Refills: 3 | OUTPATIENT
Start: 2018-07-25

## 2018-07-25 NOTE — TELEPHONE ENCOUNTER
Was the patient seen in the last year in this department? Yes     Does patient have an active prescription for medications requested? No  Discontinued on 6/19/17    Received Request Via: Pharmacy

## 2018-07-30 RX ORDER — HYDROCHLOROTHIAZIDE 25 MG/1
TABLET ORAL
Qty: 90 TAB | Refills: 1 | Status: SHIPPED | OUTPATIENT
Start: 2018-07-30 | End: 2019-04-01 | Stop reason: SDUPTHER

## 2018-08-25 ENCOUNTER — HOSPITAL ENCOUNTER (OUTPATIENT)
Dept: LAB | Facility: MEDICAL CENTER | Age: 58
End: 2018-08-25
Attending: FAMILY MEDICINE
Payer: COMMERCIAL

## 2018-08-25 LAB
ANION GAP SERPL CALC-SCNC: 8 MMOL/L (ref 0–11.9)
BUN SERPL-MCNC: 20 MG/DL (ref 8–22)
CALCIUM SERPL-MCNC: 9 MG/DL (ref 8.5–10.5)
CHLORIDE SERPL-SCNC: 106 MMOL/L (ref 96–112)
CHOLEST SERPL-MCNC: 193 MG/DL (ref 100–199)
CO2 SERPL-SCNC: 25 MMOL/L (ref 20–33)
CREAT SERPL-MCNC: 0.93 MG/DL (ref 0.5–1.4)
GLUCOSE SERPL-MCNC: 107 MG/DL (ref 65–99)
HDLC SERPL-MCNC: 30 MG/DL
POTASSIUM SERPL-SCNC: 4.3 MMOL/L (ref 3.6–5.5)
SODIUM SERPL-SCNC: 139 MMOL/L (ref 135–145)
TSH SERPL DL<=0.005 MIU/L-ACNC: 2.74 UIU/ML (ref 0.38–5.33)

## 2018-08-25 PROCEDURE — 83718 ASSAY OF LIPOPROTEIN: CPT

## 2018-08-25 PROCEDURE — 80048 BASIC METABOLIC PNL TOTAL CA: CPT

## 2018-08-25 PROCEDURE — 83721 ASSAY OF BLOOD LIPOPROTEIN: CPT

## 2018-08-25 PROCEDURE — 84443 ASSAY THYROID STIM HORMONE: CPT

## 2018-08-25 PROCEDURE — 82465 ASSAY BLD/SERUM CHOLESTEROL: CPT

## 2018-08-25 PROCEDURE — 36415 COLL VENOUS BLD VENIPUNCTURE: CPT

## 2018-08-27 LAB — LDLC SERPL-MCNC: 127 MG/DL (ref 0–129)

## 2018-10-10 DIAGNOSIS — F41.0 SEVERE ANXIETY WITH PANIC: ICD-10-CM

## 2018-10-10 RX ORDER — BUPROPION HYDROCHLORIDE 300 MG/1
300 TABLET ORAL EVERY MORNING
Qty: 90 TAB | Refills: 3 | Status: SHIPPED | OUTPATIENT
Start: 2018-10-10 | End: 2019-09-05 | Stop reason: SDUPTHER

## 2018-10-10 NOTE — TELEPHONE ENCOUNTER
Was the patient seen in the last year in this department? Yes  Does patient have an active prescription for medications requested? No   Received Request Via: Patient

## 2018-11-05 DIAGNOSIS — F41.0 SEVERE ANXIETY WITH PANIC: ICD-10-CM

## 2018-11-05 RX ORDER — ESCITALOPRAM OXALATE 20 MG/1
TABLET ORAL
Qty: 30 TAB | Refills: 3 | Status: SHIPPED | OUTPATIENT
Start: 2018-11-05 | End: 2019-03-16 | Stop reason: SDUPTHER

## 2018-12-07 ENCOUNTER — OFFICE VISIT (OUTPATIENT)
Dept: INTERNAL MEDICINE | Facility: MEDICAL CENTER | Age: 58
End: 2018-12-07
Payer: COMMERCIAL

## 2018-12-07 VITALS
HEART RATE: 69 BPM | BODY MASS INDEX: 35.21 KG/M2 | TEMPERATURE: 97.1 F | SYSTOLIC BLOOD PRESSURE: 106 MMHG | HEIGHT: 72 IN | DIASTOLIC BLOOD PRESSURE: 78 MMHG | OXYGEN SATURATION: 96 % | WEIGHT: 260 LBS

## 2018-12-07 DIAGNOSIS — K62.89 ANAL OR RECTAL PAIN: ICD-10-CM

## 2018-12-07 DIAGNOSIS — Z12.11 COLON CANCER SCREENING: ICD-10-CM

## 2018-12-07 PROCEDURE — 99213 OFFICE O/P EST LOW 20 MIN: CPT | Mod: GE | Performed by: HOSPITALIST

## 2018-12-07 NOTE — PROGRESS NOTES
Established Patient    Jose presents today with the following:    CC:   Chief Complaint   Patient presents with   • Anal Irritation     Per pt feels like something is poking him. Sore. noticied this week       HPI:   The patient is a 58-year-old male with past medical history of hypertension, obesity, hyperlipidemia presented to the clinic with complaints of anal pain associated with irritation since the past couple of days.  -He reports he has been noticing fresh streak of blood sometimes whenever he defecates, has associated irritation with mild pain.  -Denies dysuria, hematuria, recent changes in the weight, constipation or diarrhea, rash, denies family history of colon cancer.  -He never had a colonoscopy for colon cancer screening in the past.      Patient Active Problem List    Diagnosis Date Noted   • Tick bite 06/29/2018   • Fatigue 06/29/2018   • Colon cancer screening 04/04/2018   • Vitamin D deficiency 10/09/2017   • Obesity (BMI 35.0-39.9 without comorbidity) ([facility]) 10/09/2017   • Vasomotor rhinitis 11/04/2016   • Actinic keratosis 01/15/2015   • Skin lesions, generalized 01/13/2015   • Hyperlipidemia 10/10/2014   • Severe anxiety with panic 01/13/2014   • Hypertension        Current Outpatient Prescriptions   Medication Sig Dispense Refill   • Polyethylene Glycol 4500 Powder 17 g by Does not apply route every day. 1 Bottle 0   • escitalopram (LEXAPRO) 20 MG tablet TAKE 1 TABLET BY MOUTH EVERY DAY 30 Tab 3   • buPROPion (WELLBUTRIN XL) 300 MG XL tablet Take 1 Tab by mouth every morning. 90 Tab 3   • hydroCHLOROthiazide (HYDRODIURIL) 25 MG Tab TAKE 1 TAB BY MOUTH EVERY DAY. 90 Tab 1   • lisinopril (PRINIVIL) 10 MG Tab Take 1 Tab by mouth every day. 90 Tab 3   • propranolol (INDERAL) 40 MG Tab Take 1 Tab by mouth 2 times a day. 180 Tab 3   • vitamin D, Ergocalciferol, (DRISDOL) 77231 units Cap capsule Take 50,000 Units by mouth every 7 days.  2     No current facility-administered medications for  this visit.        ROS: As per HPI. Additional pertinent systems as noted below.    All others negative    /78 (BP Location: Right arm, Patient Position: Sitting, BP Cuff Size: Large adult)   Pulse 69   Temp 36.2 °C (97.1 °F) (Temporal)   Ht 1.829 m (6')   Wt 117.9 kg (260 lb)   SpO2 96%   BMI 35.26 kg/m²      Physical Exam   Constitutional:  oriented to person, place, and time. No distress.   Eyes: Pupils are equal, round, and reactive to light. No scleral icterus.  Neck: Neck supple. No thyromegaly present.   Cardiovascular: Normal rate, regular rhythm and normal heart sounds.  Exam reveals no gallop and no friction rub.  No murmur heard.  Pulmonary/Chest: Breath sounds normal. Chest wall is not dull to percussion.   Musculoskeletal:   no edema.   Lymphadenopathy: no cervical adenopathy  Neurological: alert and oriented to person, place, and time.   Skin: No cyanosis. Nails show no clubbing.  Rectal exam:  No external hemorrhoids seen.  No masses felt on exam.  Stool Guaiac test was negative    Note: I have reviewed all pertinent labs and diagnostic tests associated with this visit with specific comments listed under the assessment and plan below    Assessment and Plan    1. Anal or rectal pain  -Patient presented with symptoms of pain associated with irritation, streaks of blood on defecation since the past couple of days.  -Rectal exam: No external hemorrhoids seen.  No masses felt.  Stool guaiac test was negative.  No anal fissure seen.  -Patient is prescribed MiraLAX powder for constipation.  -Etiology unclear internal hemorrhoids versus polyp vs other   -Referred to GI for colonoscopy  -CBC is ordered to evaluate if the patient has anemia    2. Colon cancer screening  -Referral to GI for colonoscopy is ordered.      Followup: Return in about 3 months (around 3/7/2019), or with PCP.      Signed by: Saritha Nguyen M.D.

## 2019-03-18 ENCOUNTER — OFFICE VISIT (OUTPATIENT)
Dept: URGENT CARE | Facility: PHYSICIAN GROUP | Age: 59
End: 2019-03-18

## 2019-03-18 VITALS
TEMPERATURE: 97 F | DIASTOLIC BLOOD PRESSURE: 88 MMHG | SYSTOLIC BLOOD PRESSURE: 138 MMHG | HEART RATE: 68 BPM | BODY MASS INDEX: 35.89 KG/M2 | WEIGHT: 265 LBS | RESPIRATION RATE: 16 BRPM | HEIGHT: 72 IN | OXYGEN SATURATION: 95 %

## 2019-03-18 DIAGNOSIS — Z02.89 ENCOUNTER FOR EXAMINATION REQUIRED BY DEPARTMENT OF TRANSPORTATION (DOT): ICD-10-CM

## 2019-03-18 PROCEDURE — 7100 PR DOT PHYSICAL: Performed by: FAMILY MEDICINE

## 2019-04-04 RX ORDER — HYDROCHLOROTHIAZIDE 25 MG/1
TABLET ORAL
Qty: 90 TAB | Refills: 1 | Status: SHIPPED | OUTPATIENT
Start: 2019-04-04 | End: 2019-10-17 | Stop reason: SDUPTHER

## 2019-04-29 ENCOUNTER — TELEPHONE (OUTPATIENT)
Dept: MEDICAL GROUP | Facility: PHYSICIAN GROUP | Age: 59
End: 2019-04-29

## 2019-04-29 RX ORDER — PROPRANOLOL HYDROCHLORIDE 40 MG/1
TABLET ORAL
Qty: 90 TAB | Refills: 2 | Status: SHIPPED | OUTPATIENT
Start: 2019-04-29 | End: 2020-01-07

## 2019-04-29 NOTE — TELEPHONE ENCOUNTER
Phone Number Called: [phone] (home) [phone] (work)      Message: Pt called and stated he is on a couple different antidepressants.  Pt states as far as the depression, he is doing great with that.  Pt has been experiencing low energy.     Pt would like to know if this could be cause by the antidepressants?    Would you like pt to come in for an appt to discuss this issue?    Left Message for patient to call back: N\A

## 2019-05-01 ENCOUNTER — OFFICE VISIT (OUTPATIENT)
Dept: MEDICAL GROUP | Facility: MEDICAL CENTER | Age: 59
End: 2019-05-01
Payer: COMMERCIAL

## 2019-05-01 VITALS
HEIGHT: 72 IN | WEIGHT: 267.42 LBS | DIASTOLIC BLOOD PRESSURE: 72 MMHG | OXYGEN SATURATION: 94 % | TEMPERATURE: 97.7 F | HEART RATE: 76 BPM | BODY MASS INDEX: 36.22 KG/M2 | RESPIRATION RATE: 20 BRPM | SYSTOLIC BLOOD PRESSURE: 106 MMHG

## 2019-05-01 DIAGNOSIS — R53.83 FATIGUE, UNSPECIFIED TYPE: ICD-10-CM

## 2019-05-01 PROCEDURE — 99214 OFFICE O/P EST MOD 30 MIN: CPT | Performed by: FAMILY MEDICINE

## 2019-05-01 ASSESSMENT — PATIENT HEALTH QUESTIONNAIRE - PHQ9: CLINICAL INTERPRETATION OF PHQ2 SCORE: 0

## 2019-05-01 NOTE — PROGRESS NOTES
"This medical record contains text that has been entered with the assistance of computer voice recognition and dictation software.  Therefore, it may contain unintended errors in text, spelling, punctuation, or grammar        Chief Complaint   Patient presents with   • Medication Management     discuss meds, pt states been feeling tired,\"worn out\",wants to know if it's from meds        Jose Dolan is a 58 y.o. male here evaluation and management of:fatigue follow up , anxiety htn follow up     HPI:     He is 56 yo M he has h/o anxiety and htn   htn is well controlled but his anxiety is poorly controlled    airs break and allignment family compaany   started by his parents and they passed  he has a son and daughter in town   daughter wants to be dental hygenist   [address] [place] [address]        Current Outpatient Prescriptions   Medication Sig Dispense Refill   • propranolol (INDERAL) 40 MG Tab TAKE 1 TABLET ORALLY TWICE DAILY 90 Tab 2   • hydroCHLOROthiazide (HYDRODIURIL) 25 MG Tab TAKE 1 TABLET BY MOUTH EVERY DAY 90 Tab 1   • escitalopram (LEXAPRO) 20 MG tablet TAKE 1 TABLET BY MOUTH EVERY DAY 90 Tab 0   • Polyethylene Glycol 4500 Powder 17 g by Does not apply route every day. (Patient not taking: Reported on 3/18/2019) 1 Bottle 0   • buPROPion (WELLBUTRIN XL) 300 MG XL tablet Take 1 Tab by mouth every morning. 90 Tab 3   • lisinopril (PRINIVIL) 10 MG Tab Take 1 Tab by mouth every day. 90 Tab 3   • vitamin D, Ergocalciferol, (DRISDOL) 14975 units Cap capsule Take 50,000 Units by mouth every 7 days.  2     No current facility-administered medications for this visit.      Patient Active Problem List    Diagnosis Date Noted   • Tick bite 06/29/2018   • Fatigue 06/29/2018   • Colon cancer screening 04/04/2018   • Vitamin D deficiency 10/09/2017   • Obesity (BMI 35.0-39.9 without comorbidity) (HCC) 10/09/2017   • Vasomotor rhinitis 11/04/2016   • Actinic keratosis 01/15/2015   • Skin lesions, generalized 01/13/2015   • " Hyperlipidemia 10/10/2014   • Severe anxiety with panic 01/13/2014   • Hypertension      Past Surgical History:   Procedure Laterality Date   • TONSILLECTOMY        Social History   Substance Use Topics   • Smoking status: Never Smoker   • Smokeless tobacco: Never Used   • Alcohol use 1.0 oz/week     1 Glasses of wine, 1 Cans of beer per week      Comment: Occasionally her     Family History   Problem Relation Age of Onset   • Heart Disease Father            ROS  + anxiety  No SI/HI  all review of system completed and negative except for those listed above     Objective:     /72 (BP Location: Right arm, Patient Position: Sitting)   Pulse 76   Temp 36.5 °C (97.7 °F) (Temporal)   Resp 20   Ht 1.829 m (6')   Wt 121.3 kg (267 lb 6.7 oz)   SpO2 94%  Body mass index is 36.27 kg/m².  Physical Exam:    Constitutional: Alert, no distress.  Skin: Warm, dry, good turgor  Eye: Equal, round and reactive, conjunctiva clear, lids normal.  ENMT: Lips without lesions, good dentition, oropharynx clear.  Neck: Trachea midline, no masses, no thyromegaly. No cervical or supraclavicular lymphadenopathy.  Respiratory: Unlabored respiratory effort, lungs clear to auscultation, no wheezes, no ronchi.  Cardiovascular: Normal S1, S2, no murmur, no edema.  Abdomen: Soft, non-tender, no masses, no hepatosplenomegaly.  Psych: Alert and oriented x3, normal affect and mood.    No target lesions  No umbilical hernia        Assessment and Plan:   The following treatment plan was discussed        Problem List Items Addressed This Visit     Fatigue     He had a handful of tickbites in [facility] and has been tired since  Will test for lyme      Will add on a1c   More importantly will do sleep study    We review labs      Over 25 minutes spent with patient face to face, greater than 50% time spent with plan/coordination of care regarding that which is discussed in the HPI and A&P           Relevant Orders    REFERRAL TO SLEEP STUDIES     HEMOGLOBIN A1C                Instructed to follow up if symptoms worsen or fail to improve, ER/UC precautions discussed as well    Kaylene Nova MD  [facility], [address]   [place] [address]  Phone: [phone]

## 2019-05-01 NOTE — ASSESSMENT & PLAN NOTE
He had a handful of tickbites in [facility] and has been tired since  Will test for lyme      Will add on a1c   More importantly will do sleep study    We review labs      Over 25 minutes spent with patient face to face, greater than 50% time spent with plan/coordination of care regarding that which is discussed in the HPI and A&P

## 2019-06-05 DIAGNOSIS — I10 ESSENTIAL HYPERTENSION: ICD-10-CM

## 2019-06-05 RX ORDER — LISINOPRIL 10 MG/1
TABLET ORAL
Qty: 90 TAB | Refills: 1 | Status: SHIPPED | OUTPATIENT
Start: 2019-06-05 | End: 2019-12-03 | Stop reason: SDUPTHER

## 2019-06-28 DIAGNOSIS — F41.0 SEVERE ANXIETY WITH PANIC: ICD-10-CM

## 2019-07-01 DIAGNOSIS — F41.0 SEVERE ANXIETY WITH PANIC: ICD-10-CM

## 2019-07-01 RX ORDER — ESCITALOPRAM OXALATE 20 MG/1
20 TABLET ORAL
Qty: 90 TAB | Refills: 0 | Status: SHIPPED | OUTPATIENT
Start: 2019-07-01 | End: 2020-08-03 | Stop reason: SDUPTHER

## 2019-07-01 RX ORDER — ESCITALOPRAM OXALATE 20 MG/1
TABLET ORAL
Qty: 90 TAB | Refills: 0 | Status: SHIPPED | OUTPATIENT
Start: 2019-07-01 | End: 2020-01-21

## 2019-09-05 DIAGNOSIS — F41.0 SEVERE ANXIETY WITH PANIC: ICD-10-CM

## 2019-09-05 RX ORDER — BUPROPION HYDROCHLORIDE 300 MG/1
TABLET ORAL
Qty: 90 TAB | Refills: 3 | Status: SHIPPED | OUTPATIENT
Start: 2019-09-05 | End: 2020-09-09

## 2019-10-17 RX ORDER — HYDROCHLOROTHIAZIDE 25 MG/1
TABLET ORAL
Qty: 90 TAB | Refills: 1 | Status: SHIPPED | OUTPATIENT
Start: 2019-10-17 | End: 2020-03-24 | Stop reason: SDUPTHER

## 2019-12-03 DIAGNOSIS — I10 ESSENTIAL HYPERTENSION: ICD-10-CM

## 2019-12-04 RX ORDER — LISINOPRIL 10 MG/1
TABLET ORAL
Qty: 90 TAB | Refills: 0 | Status: SHIPPED | OUTPATIENT
Start: 2019-12-04 | End: 2020-03-10

## 2019-12-16 ENCOUNTER — TELEPHONE (OUTPATIENT)
Dept: MEDICAL GROUP | Facility: MEDICAL CENTER | Age: 59
End: 2019-12-16

## 2019-12-16 NOTE — TELEPHONE ENCOUNTER
1. Caller Name: Jose Dolan                                         Call Back Number: [phone] (home) [phone] (work)      Patient approves a detailed voicemail message: yes    Pt called stating that he has been battling a cold for the past week, most of his symptoms have subsided, but still has a lingering cough that is keep him awake through the night.  Wondering if you can call in a cough syrup or tabs to his pharmacy.  Patient also wanted to let Dr. Nova know that the anti-depressants Lexapro is no longer working.  Wondering what he do ?  Poss increase.

## 2019-12-19 NOTE — TELEPHONE ENCOUNTER
Sent [employer] message to notify pt to come in per Dr. Avtar Nova M.D.  Saira Bal Ma 14 hours ago (5:31 PM)      He can come on in and we can address those things in an appointment

## 2020-01-07 RX ORDER — PROPRANOLOL HYDROCHLORIDE 40 MG/1
TABLET ORAL
Qty: 180 TAB | Refills: 0 | Status: SHIPPED | OUTPATIENT
Start: 2020-01-07 | End: 2020-03-24 | Stop reason: SDUPTHER

## 2020-01-19 DIAGNOSIS — F41.0 SEVERE ANXIETY WITH PANIC: ICD-10-CM

## 2020-01-21 RX ORDER — ESCITALOPRAM OXALATE 20 MG/1
TABLET ORAL
Qty: 90 TAB | Refills: 0 | Status: SHIPPED | OUTPATIENT
Start: 2020-01-21 | End: 2020-03-24 | Stop reason: SDUPTHER

## 2020-03-09 DIAGNOSIS — I10 ESSENTIAL HYPERTENSION: ICD-10-CM

## 2020-03-10 RX ORDER — LISINOPRIL 10 MG/1
TABLET ORAL
Qty: 90 TAB | Refills: 0 | Status: SHIPPED | OUTPATIENT
Start: 2020-03-10 | End: 2020-03-24 | Stop reason: SDUPTHER
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
## 2020-05-12 ENCOUNTER — HOSPITAL ENCOUNTER (OUTPATIENT)
Dept: LAB | Facility: MEDICAL CENTER | Age: 60
End: 2020-05-12
Attending: FAMILY MEDICINE
Payer: COMMERCIAL

## 2020-05-12 DIAGNOSIS — R73.09 ELEVATED GLUCOSE: ICD-10-CM

## 2020-05-12 DIAGNOSIS — E78.1 PURE HYPERTRIGLYCERIDEMIA: ICD-10-CM

## 2020-05-12 DIAGNOSIS — Z11.59 NEED FOR HEPATITIS C SCREENING TEST: ICD-10-CM

## 2020-05-12 DIAGNOSIS — I10 ESSENTIAL HYPERTENSION: ICD-10-CM

## 2020-05-12 LAB
ANION GAP SERPL CALC-SCNC: 10 MMOL/L (ref 7–16)
BUN SERPL-MCNC: 12 MG/DL (ref 8–22)
CALCIUM SERPL-MCNC: 9.1 MG/DL (ref 8.5–10.5)
CHLORIDE SERPL-SCNC: 101 MMOL/L (ref 96–112)
CHOLEST SERPL-MCNC: 170 MG/DL (ref 100–199)
CO2 SERPL-SCNC: 24 MMOL/L (ref 20–33)
CREAT SERPL-MCNC: 0.78 MG/DL (ref 0.5–1.4)
CREAT UR-MCNC: 194.65 MG/DL
EST. AVERAGE GLUCOSE BLD GHB EST-MCNC: 117 MG/DL
GLUCOSE SERPL-MCNC: 87 MG/DL (ref 65–99)
HBA1C MFR BLD: 5.7 % (ref 0–5.6)
HCV AB SER QL: NORMAL
HDLC SERPL-MCNC: 32 MG/DL
LDLC SERPL CALC-MCNC: 120 MG/DL
MICROALBUMIN UR-MCNC: <1.2 MG/DL
MICROALBUMIN/CREAT UR: NORMAL MG/G (ref 0–30)
POTASSIUM SERPL-SCNC: 3.6 MMOL/L (ref 3.6–5.5)
SODIUM SERPL-SCNC: 135 MMOL/L (ref 135–145)
TRIGL SERPL-MCNC: 88 MG/DL (ref 0–149)

## 2020-05-12 PROCEDURE — 80048 BASIC METABOLIC PNL TOTAL CA: CPT

## 2020-05-12 PROCEDURE — 82043 UR ALBUMIN QUANTITATIVE: CPT

## 2020-05-12 PROCEDURE — 82570 ASSAY OF URINE CREATININE: CPT

## 2020-05-12 PROCEDURE — 83036 HEMOGLOBIN GLYCOSYLATED A1C: CPT

## 2020-05-12 PROCEDURE — 36415 COLL VENOUS BLD VENIPUNCTURE: CPT

## 2020-05-12 PROCEDURE — 86803 HEPATITIS C AB TEST: CPT

## 2020-05-12 PROCEDURE — 80061 LIPID PANEL: CPT

## 2020-05-12 PROCEDURE — 86617 LYME DISEASE ANTIBODY: CPT

## 2020-05-14 LAB — B BURGDOR IGM SER QL IB: NEGATIVE

## 2020-05-19 ENCOUNTER — TELEMEDICINE (OUTPATIENT)
Dept: MEDICAL GROUP | Facility: MEDICAL CENTER | Age: 60
End: 2020-05-19
Payer: COMMERCIAL

## 2020-05-19 VITALS
HEART RATE: 59 BPM | BODY MASS INDEX: 32.47 KG/M2 | HEIGHT: 73 IN | WEIGHT: 245 LBS | SYSTOLIC BLOOD PRESSURE: 138 MMHG | DIASTOLIC BLOOD PRESSURE: 83 MMHG

## 2020-05-19 DIAGNOSIS — I10 ESSENTIAL HYPERTENSION: ICD-10-CM

## 2020-05-19 DIAGNOSIS — F41.0 SEVERE ANXIETY WITH PANIC: ICD-10-CM

## 2020-05-19 PROCEDURE — 99214 OFFICE O/P EST MOD 30 MIN: CPT | Mod: 95,CR | Performed by: FAMILY MEDICINE

## 2020-05-19 SDOH — HEALTH STABILITY: MENTAL HEALTH: HOW OFTEN DO YOU HAVE A DRINK CONTAINING ALCOHOL?: MONTHLY OR LESS

## 2020-05-19 ASSESSMENT — PATIENT HEALTH QUESTIONNAIRE - PHQ9: CLINICAL INTERPRETATION OF PHQ2 SCORE: 0

## 2020-05-19 NOTE — ASSESSMENT & PLAN NOTE
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

## 2020-05-19 NOTE — PROGRESS NOTES
Telemedicine Visit: Established Patient     This encounter was conducted via doximity    Verbal consent was obtained. Patient's identity was verified.    Subjective:   CC: htn follow up and anxiety follow up   Jose Dolan is a 59 y.o. 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 No nausea or vomiting. No chest pains or shortness of breath.     Allergies   Allergen Reactions   • Pcn [Penicillins]        Current medicines (including changes today)  Current Outpatient Medications   Medication Sig Dispense Refill   • hydroCHLOROthiazide (HYDRODIURIL) 25 MG Tab Take 1 Tab by mouth every day. 90 Tab 0   • propranolol (INDERAL) 40 MG Tab Take 1 Tab by mouth 2 Times a Day. 180 Tab 0   • lisinopril (PRINIVIL) 10 MG Tab Take 1 Tab by mouth every day. 90 Tab 0   • buPROPion (WELLBUTRIN XL) 300 MG XL tablet TAKE 1 TABLET BY MOUTH EVERY DAY IN THE MORNING 90 Tab 3   • escitalopram (LEXAPRO) 20 MG tablet Take 1 Tab by mouth every day. 90 Tab 0   • vitamin D, Ergocalciferol, (DRISDOL) 07765 units Cap capsule Take 50,000 Units by mouth every 7 days.  2   • escitalopram (LEXAPRO) 20 MG tablet Take 1 Tab by mouth every day. (Patient not taking: Reported on 5/19/2020) 90 Tab 0   • Polyethylene Glycol 4500 Powder 17 g by Does not apply route every day. (Patient not taking: Reported on 3/18/2019) 1 Bottle 0     No current facility-administered medications for this visit.        Patient Active Problem List    Diagnosis Date Noted   • Fatigue 06/29/2018   • Colon cancer screening 04/04/2018   • Vitamin D deficiency 10/09/2017   • Obesity (BMI 35.0-39.9 without comorbidity) (HCC) 10/09/2017   • Vasomotor rhinitis 11/04/2016   • Hyperlipidemia 10/10/2014   • Severe anxiety with panic 01/13/2014   • Hypertension        Family History   Problem Relation Age of  "Onset   • Heart Disease Father        He  has a past medical history of Chest pain at rest (1/13/2014), Cough (3/1/2016), Depression, HTN, Hyperlipidemia (10/10/2014), Hypertension, and Situational mixed anxiety and depressive disorder (1/13/2014).  He  has a past surgical history that includes tonsillectomy.       Objective:   /83 (BP Location: Left arm, Patient Position: Sitting, BP Cuff Size: Adult) Comment: pt stated  Pulse (!) 59 Comment: pt stated  Ht 1.854 m (6' 1\") Comment: pt stated  Wt 111.1 kg (245 lb) Comment: pt stated  BMI 32.32 kg/m²     Physical Exam:  Encounter Vitals  Standard Vitals  Vitals  Blood Pressure: 138/83(pt stated)  Pulse: (!) 59(pt stated)  Height: 185.4 cm (6' 1\")(pt stated)  Weight: 111.1 kg (245 lb)(pt stated)  Encounter Vitals  Blood Pressure: 138/83(pt stated)  Pulse: (!) 59(pt stated)  Weight: 111.1 kg (245 lb)(pt stated)  Height: 185.4 cm (6' 1\")(pt stated)  BMI (Calculated): 32.32  Pulmonary-Specific Vitals     Durable Medical Equipment-Specific Vitals           Constitutional: Alert, no distress, well-groomed.  Skin: No rashes in visible areas.  Eye: Round. Conjunctiva clear, lids normal. No icterus.   ENMT: Lips pink without lesions, good dentition, moist mucous membranes. Phonation normal.  Neck: No masses, no thyromegaly. Moves freely without pain.  CV: Pulse as reported by patient  Respiratory: Unlabored respiratory effort, no cough or audible wheeze  Psych: Alert and oriented x3, normal affect and mood.       Assessment and Plan:   The following treatment plan was discussed:     1. Essential hypertension    2. 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   Kaylene Nova M.D.

## 2020-06-17 DIAGNOSIS — I10 ESSENTIAL HYPERTENSION: ICD-10-CM

## 2020-06-17 RX ORDER — HYDROCHLOROTHIAZIDE 25 MG/1
TABLET ORAL
Qty: 90 TAB | Refills: 0 | Status: SHIPPED | OUTPATIENT
Start: 2020-06-17 | End: 2020-11-18

## 2020-07-15 ENCOUNTER — OFFICE VISIT (OUTPATIENT)
Dept: URGENT CARE | Facility: PHYSICIAN GROUP | Age: 60
End: 2020-07-15
Payer: COMMERCIAL

## 2020-07-15 VITALS
SYSTOLIC BLOOD PRESSURE: 132 MMHG | HEIGHT: 70 IN | RESPIRATION RATE: 14 BRPM | OXYGEN SATURATION: 95 % | DIASTOLIC BLOOD PRESSURE: 82 MMHG | BODY MASS INDEX: 37.51 KG/M2 | HEART RATE: 73 BPM | TEMPERATURE: 96.4 F | WEIGHT: 262 LBS

## 2020-07-15 DIAGNOSIS — Z02.4 ENCOUNTER FOR COMMERCIAL DRIVER MEDICAL EXAMINATION (CDME): ICD-10-CM

## 2020-07-15 PROCEDURE — 7100 PR DOT PHYSICAL: Performed by: NURSE PRACTITIONER

## 2020-07-15 NOTE — PROGRESS NOTES
Tommy is here for  medical examination today.    See medical examination forms attached      1. Encounter for  medical examination (CDME)         Qualified for 1 year medical certificate due to HTN.

## 2020-07-21 DIAGNOSIS — I10 ESSENTIAL HYPERTENSION: ICD-10-CM

## 2020-07-21 RX ORDER — PROPRANOLOL HYDROCHLORIDE 40 MG/1
TABLET ORAL
Qty: 180 TAB | Refills: 0 | Status: SHIPPED | OUTPATIENT
Start: 2020-07-21 | End: 2020-10-27

## 2020-08-03 DIAGNOSIS — F41.0 SEVERE ANXIETY WITH PANIC: ICD-10-CM

## 2020-08-04 RX ORDER — ESCITALOPRAM OXALATE 20 MG/1
20 TABLET ORAL
Qty: 90 TAB | Refills: 0 | Status: SHIPPED | OUTPATIENT
Start: 2020-08-04 | End: 2020-10-27

## 2020-09-09 DIAGNOSIS — F41.0 SEVERE ANXIETY WITH PANIC: ICD-10-CM

## 2020-09-09 RX ORDER — BUPROPION HYDROCHLORIDE 300 MG/1
TABLET ORAL
Qty: 90 TAB | Refills: 3 | Status: SHIPPED | OUTPATIENT
Start: 2020-09-09 | End: 2020-11-24 | Stop reason: SDUPTHER

## 2020-10-27 DIAGNOSIS — I10 ESSENTIAL HYPERTENSION: ICD-10-CM

## 2020-10-27 DIAGNOSIS — F41.0 SEVERE ANXIETY WITH PANIC: ICD-10-CM

## 2020-10-27 RX ORDER — PROPRANOLOL HYDROCHLORIDE 40 MG/1
TABLET ORAL
Qty: 180 TAB | Refills: 0 | Status: SHIPPED | OUTPATIENT
Start: 2020-10-27 | End: 2020-11-24 | Stop reason: SDUPTHER

## 2020-10-27 RX ORDER — ESCITALOPRAM OXALATE 20 MG/1
TABLET ORAL
Qty: 90 TAB | Refills: 0 | Status: SHIPPED | OUTPATIENT
Start: 2020-10-27 | End: 2020-11-24 | Stop reason: SDUPTHER

## 2020-11-15 DIAGNOSIS — I10 ESSENTIAL HYPERTENSION: ICD-10-CM

## 2020-11-18 RX ORDER — HYDROCHLOROTHIAZIDE 25 MG/1
TABLET ORAL
Qty: 90 TAB | Refills: 0 | Status: SHIPPED | OUTPATIENT
Start: 2020-11-18 | End: 2020-11-24 | Stop reason: SDUPTHER

## 2020-11-24 ENCOUNTER — TELEMEDICINE (OUTPATIENT)
Dept: MEDICAL GROUP | Facility: MEDICAL CENTER | Age: 60
End: 2020-11-24
Payer: COMMERCIAL

## 2020-11-24 VITALS
BODY MASS INDEX: 34.67 KG/M2 | DIASTOLIC BLOOD PRESSURE: 74 MMHG | SYSTOLIC BLOOD PRESSURE: 116 MMHG | WEIGHT: 256 LBS | HEIGHT: 72 IN

## 2020-11-24 DIAGNOSIS — E78.5 DYSLIPIDEMIA: ICD-10-CM

## 2020-11-24 DIAGNOSIS — Z12.5 SPECIAL SCREENING FOR MALIGNANT NEOPLASM OF PROSTATE: ICD-10-CM

## 2020-11-24 DIAGNOSIS — I10 ESSENTIAL HYPERTENSION: ICD-10-CM

## 2020-11-24 DIAGNOSIS — N52.9 ERECTILE DYSFUNCTION, UNSPECIFIED ERECTILE DYSFUNCTION TYPE: ICD-10-CM

## 2020-11-24 DIAGNOSIS — F41.0 SEVERE ANXIETY WITH PANIC: ICD-10-CM

## 2020-11-24 PROCEDURE — 99214 OFFICE O/P EST MOD 30 MIN: CPT | Mod: 95,CR | Performed by: FAMILY MEDICINE

## 2020-11-24 RX ORDER — BUPROPION HYDROCHLORIDE 300 MG/1
300 TABLET ORAL EVERY MORNING
Qty: 90 TAB | Refills: 3 | Status: SHIPPED | OUTPATIENT
Start: 2020-11-24 | End: 2021-07-01 | Stop reason: SDUPTHER

## 2020-11-24 RX ORDER — ESCITALOPRAM OXALATE 20 MG/1
20 TABLET ORAL
Qty: 90 TAB | Refills: 0 | Status: SHIPPED | OUTPATIENT
Start: 2020-11-24 | End: 2021-02-25

## 2020-11-24 RX ORDER — SILDENAFIL 100 MG/1
TABLET, FILM COATED ORAL
Qty: 10 TAB | Refills: 11 | Status: SHIPPED | OUTPATIENT
Start: 2020-11-24 | End: 2021-07-01 | Stop reason: SDUPTHER

## 2020-11-24 RX ORDER — HYDROCHLOROTHIAZIDE 25 MG/1
25 TABLET ORAL
Qty: 90 TAB | Refills: 2 | Status: SHIPPED | OUTPATIENT
Start: 2020-11-24 | End: 2021-07-01 | Stop reason: SDUPTHER

## 2020-11-24 RX ORDER — PROPRANOLOL HYDROCHLORIDE 40 MG/1
TABLET ORAL
Qty: 180 TAB | Refills: 3 | Status: SHIPPED | OUTPATIENT
Start: 2020-11-24 | End: 2021-07-01 | Stop reason: SDUPTHER

## 2020-11-24 RX ORDER — LISINOPRIL 10 MG/1
10 TABLET ORAL
Qty: 90 TAB | Refills: 4 | Status: SHIPPED | OUTPATIENT
Start: 2020-11-24 | End: 2021-07-01 | Stop reason: SDUPTHER

## 2020-11-24 NOTE — ASSESSMENT & PLAN NOTE
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

## 2020-11-24 NOTE — PROGRESS NOTES
Virtual Visit: Established Patient   This visit was conducted via Zoom using secure and encrypted videoconferencing technology. The patient was in a private location in the state of [place].    The patient's identity was confirmed and verbal consent was obtained for this virtual visit.    Subjective:   CC:   Chief Complaint   Patient presents with   • Follow-Up   • Medication Refill     propranolol, escitalopram, hctz, lisinopril, wellbutrin       Jose Dolan is a 60 y.o. male presenting for evaluation and management of:    6 mo follow up htn       ROS   Denies any recent fevers or chills. No nausea or vomiting. No chest pains or shortness of breath.     Allergies   Allergen Reactions   • Pcn [Penicillins]        Current medicines (including changes today)  Current Outpatient Medications   Medication Sig Dispense Refill   • buPROPion (WELLBUTRIN XL) 300 MG XL tablet Take 1 Tab by mouth every morning. 90 Tab 3   • escitalopram (LEXAPRO) 20 MG tablet Take 1 Tab by mouth every day. 90 Tab 0   • hydroCHLOROthiazide (HYDRODIURIL) 25 MG Tab Take 1 Tab by mouth every day. 90 Tab 2   • lisinopril (PRINIVIL) 10 MG Tab Take 1 Tab by mouth every day. 90 Tab 4   • propranolol (INDERAL) 40 MG Tab TAKE 1 TABLET BY MOUTH TWICE A  Tab 3   • sildenafil citrate (VIAGRA) 100 MG tablet 1/4-1 tab po approx 15 min prior to as needed 10 Tab 11   • vitamin D, Ergocalciferol, (DRISDOL) 75963 units Cap capsule Take 50,000 Units by mouth every 7 days.  2     No current facility-administered medications for this visit.        Patient Active Problem List    Diagnosis Date Noted   • Erectile dysfunction 11/24/2020   • Fatigue 06/29/2018   • Colon cancer screening 04/04/2018   • Vitamin D deficiency 10/09/2017   • Obesity (BMI 35.0-39.9 without comorbidity) ([facility]) 10/09/2017   • Vasomotor rhinitis 11/04/2016   • Hyperlipidemia 10/10/2014   • Severe anxiety with panic 01/13/2014   • Hypertension        Family History   Problem  Relation Age of Onset   • Heart Disease Father        He  has a past medical history of Chest pain at rest (1/13/2014), Cough (3/1/2016), Depression, HTN, Hyperlipidemia (10/10/2014), Hypertension, and Situational mixed anxiety and depressive disorder (1/13/2014).  He  has a past surgical history that includes tonsillectomy.       Objective:   /74 (BP Location: Left arm, Patient Position: Sitting, BP Cuff Size: Adult) Comment: pt stated  Ht 1.829 m (6') Comment: pt stated  Wt 116.1 kg (256 lb) Comment: pt stated  BMI 34.72 kg/m²     Physical Exam  Constitutional: Alert, no distress, well-groomed.  Skin: No rashes in visible areas.  Eye: Round. Conjunctiva clear, lids normal. No icterus.   ENMT: Lips pink without lesions, good dentition, moist mucous membranes. Phonation normal.  Neck: No masses, no thyromegaly. Moves freely without pain.  Respiratory: Unlabored respiratory effort, no cough or audible wheeze  Psych: Alert and oriented x3, normal affect and mood.       Assessment and Plan:   The following treatment plan was discussed:     1. Essential hypertension  - Basic Metabolic Panel; Future  - Lipid Profile; Future  - MICROALBUMIN CREAT RATIO URINE; Future  - hydroCHLOROthiazide (HYDRODIURIL) 25 MG Tab; Take 1 Tab by mouth every day.  Dispense: 90 Tab; Refill: 2  - lisinopril (PRINIVIL) 10 MG Tab; Take 1 Tab by mouth every day.  Dispense: 90 Tab; Refill: 4  - propranolol (INDERAL) 40 MG Tab; TAKE 1 TABLET BY MOUTH TWICE A DAY  Dispense: 180 Tab; Refill: 3  - Basic Metabolic Panel; Future  - Lipid Profile; Future  - MICROALBUMIN CREAT RATIO URINE; Future    2. Dyslipidemia  - Basic Metabolic Panel; Future  - Lipid Profile; Future  - MICROALBUMIN CREAT RATIO URINE; Future  - Basic Metabolic Panel; Future  - Lipid Profile; Future  - MICROALBUMIN CREAT RATIO URINE; Future    3. Severe anxiety with panic  - buPROPion (WELLBUTRIN XL) 300 MG XL tablet; Take 1 Tab by mouth every morning.  Dispense: 90 Tab;  Refill: 3  - escitalopram (LEXAPRO) 20 MG tablet; Take 1 Tab by mouth every day.  Dispense: 90 Tab; Refill: 0    4. Special screening for malignant neoplasm of prostate  - PROSTATE SPECIFIC AG SCREENING; Future    5. Erectile dysfunction, unspecified erectile dysfunction type  - sildenafil citrate (VIAGRA) 100 MG tablet; 1/4-1 tab po approx 15 min prior to as needed  Dispense: 10 Tab; Refill: 11    Problem List Items Addressed This Visit     Hypertension     Review home blood pressure log   Excellent control   [facility]   Review labs     Over 25 minutes spent with patient face to face, greater than 50% time spent with plan/coordination of care regarding that which is discussed in the HPI and A&P           Relevant Medications    hydroCHLOROthiazide (HYDRODIURIL) 25 MG Tab    lisinopril (PRINIVIL) 10 MG Tab    propranolol (INDERAL) 40 MG Tab    sildenafil citrate (VIAGRA) 100 MG tablet    Other Relevant Orders    Basic Metabolic Panel    Lipid Profile    MICROALBUMIN CREAT RATIO URINE    Basic Metabolic Panel    Lipid Profile    MICROALBUMIN CREAT RATIO URINE    Severe anxiety with panic     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 but its very low stress                Relevant Medications    buPROPion (WELLBUTRIN XL) 300 MG XL tablet    escitalopram (LEXAPRO) 20 MG tablet    Erectile dysfunction     Sent in rx for viagra   Risk benefit side effect discussed               Relevant Medications    sildenafil citrate (VIAGRA) 100 MG tablet      Other Visit Diagnoses     Dyslipidemia        Relevant Orders    Basic Metabolic Panel    Lipid Profile    MICROALBUMIN CREAT RATIO URINE    Basic Metabolic Panel    Lipid Profile    MICROALBUMIN CREAT RATIO URINE    Special screening for malignant neoplasm of prostate        Relevant Orders    PROSTATE SPECIFIC AG SCREENING              Follow-up: No follow-ups on file.

## 2020-11-24 NOTE — ASSESSMENT & PLAN NOTE
Review home blood pressure log   Excellent control   [facility]   Review labs     Over 25 minutes spent with patient face to face, greater than 50% time spent with plan/coordination of care regarding that which is discussed in the HPI and A&P

## 2021-02-08 ENCOUNTER — NURSE TRIAGE (OUTPATIENT)
Dept: MEDICAL GROUP | Facility: MEDICAL CENTER | Age: 61
End: 2021-02-08

## 2021-02-08 NOTE — TELEPHONE ENCOUNTER
Pt left vm stating he is having side effects from a medication that was recently prescribed to him (did not state which one).

## 2021-02-08 NOTE — TELEPHONE ENCOUNTER
Returned call to pt. Pt states that after taking his Viagra he becomes light headed at times. Pt rx states to take 1/4-1 tab 15 mins prior to PRN. Pt states that he is taking 1 tab when needed. Advised pt to take 1/2 tab and to call if symptoms do not resolve. Pt verbalized understanding and denies any further needs at this time.         Additional Information  • Negative: Drug overdose and triager unable to answer question  • Negative: Caller requesting information unrelated to medicine  • Negative: Caller requesting a prescription for Strep throat and has a positive culture result  • Negative: Rash while taking a medication or within 3 days of stopping it  • Negative: Immunization reaction suspected  • Negative: Asthma and having symptoms of asthma (cough, wheezing, etc.)  • Negative: Breastfeeding questions about mother's medicines and diet  • Negative: MORE THAN A DOUBLE DOSE of a prescription or over-the-counter (OTC) drug  • Negative: DOUBLE DOSE (an extra dose or lesser amount) of over-the-counter (OTC) drug and any symptoms (e.g., dizziness, nausea, pain, sleepiness)  • Negative: DOUBLE DOSE (an extra dose or lesser amount) of prescription drug and any symptoms (e.g., dizziness, nausea, pain, sleepiness)  • Negative: Took another person's prescription drug  • Negative: DOUBLE DOSE (an extra dose or lesser amount) of prescription drug and NO symptoms (Exception: a double dose of antibiotics)  • Negative: Diabetes drug error or overdose (e.g., took wrong type of insulin or took extra dose)  • Negative: Caller has medication question about med not prescribed by PCP and triager unable to answer question (e.g., compatibility with other med, storage)  • Negative: Request for URGENT new prescription or refill of 'essential' medication (i.e., likelihood of harm to patient if not taken) and triager unable to fill per department policy  • Negative: Prescription not at pharmacy and was prescribed today by PCP  •  "Negative: Pharmacy calling with prescription questions and triager unable to answer question  • Negative: Caller has URGENT medication question about med that PCP prescribed and triager unable to answer question  • Negative: Caller has NON-URGENT medication question about med that PCP prescribed and triager unable to answer question  • Negative: Caller requesting a NON-URGENT new prescription or refill and triager unable to refill per department policy  • Negative: DOUBLE DOSE (an extra dose or lesser amount) of over-the-counter (OTC) drug and NO symptoms  • Negative: DOUBLE DOSE (an extra dose or lesser amount) of antibiotic drug and NO symptoms  • Negative: Caller has medication question only, adult not sick, and triager answers question  • Negative: Caller has medication question, adult has minor symptoms, caller declines triage, and triager answers question  • Negative: Caller requesting a refill, no triage required, and triager able to refill per department policy  • Negative: Pharmacy calling with prescription question and triager answers question    Answer Assessment - Initial Assessment Questions  1. SYMPTOMS: \"Do you have any symptoms?\"      Yes, lightheadedness  2. SEVERITY: If symptoms are present, ask \"Are they mild, moderate or severe?\"      Mild    Protocols used: MEDICATION QUESTION CALL-A-OH    "

## 2021-02-25 DIAGNOSIS — F41.0 SEVERE ANXIETY WITH PANIC: ICD-10-CM

## 2021-02-25 RX ORDER — ESCITALOPRAM OXALATE 20 MG/1
TABLET ORAL
Qty: 90 TABLET | Refills: 0 | Status: SHIPPED | OUTPATIENT
Start: 2021-02-25 | End: 2021-07-01 | Stop reason: SDUPTHER

## 2021-03-15 DIAGNOSIS — Z23 NEED FOR VACCINATION: ICD-10-CM

## 2021-07-01 ENCOUNTER — TELEMEDICINE (OUTPATIENT)
Dept: MEDICAL GROUP | Facility: MEDICAL CENTER | Age: 61
End: 2021-07-01
Payer: COMMERCIAL

## 2021-07-01 VITALS
SYSTOLIC BLOOD PRESSURE: 129 MMHG | WEIGHT: 265 LBS | HEIGHT: 72 IN | BODY MASS INDEX: 35.89 KG/M2 | HEART RATE: 68 BPM | DIASTOLIC BLOOD PRESSURE: 85 MMHG

## 2021-07-01 DIAGNOSIS — Z00.00 PREVENTATIVE HEALTH CARE: ICD-10-CM

## 2021-07-01 DIAGNOSIS — N52.9 ERECTILE DYSFUNCTION, UNSPECIFIED ERECTILE DYSFUNCTION TYPE: ICD-10-CM

## 2021-07-01 DIAGNOSIS — I10 ESSENTIAL HYPERTENSION: ICD-10-CM

## 2021-07-01 DIAGNOSIS — E78.1 PURE HYPERTRIGLYCERIDEMIA: ICD-10-CM

## 2021-07-01 DIAGNOSIS — F41.0 SEVERE ANXIETY WITH PANIC: ICD-10-CM

## 2021-07-01 DIAGNOSIS — Z12.5 SPECIAL SCREENING FOR MALIGNANT NEOPLASM OF PROSTATE: ICD-10-CM

## 2021-07-01 PROCEDURE — 99214 OFFICE O/P EST MOD 30 MIN: CPT | Mod: 95 | Performed by: FAMILY MEDICINE

## 2021-07-01 RX ORDER — SILDENAFIL 100 MG/1
TABLET, FILM COATED ORAL
Qty: 10 TABLET | Refills: 11 | Status: SHIPPED | OUTPATIENT
Start: 2021-07-01 | End: 2023-12-22

## 2021-07-01 RX ORDER — ESCITALOPRAM OXALATE 20 MG/1
20 TABLET ORAL
Qty: 90 TABLET | Refills: 3 | Status: SHIPPED | OUTPATIENT
Start: 2021-07-01 | End: 2022-10-04

## 2021-07-01 RX ORDER — LISINOPRIL 10 MG/1
10 TABLET ORAL
Qty: 90 TABLET | Refills: 4 | Status: SHIPPED | OUTPATIENT
Start: 2021-07-01 | End: 2022-08-16

## 2021-07-01 RX ORDER — BUPROPION HYDROCHLORIDE 300 MG/1
300 TABLET ORAL EVERY MORNING
Qty: 90 TABLET | Refills: 3 | Status: SHIPPED | OUTPATIENT
Start: 2021-07-01 | End: 2022-03-18

## 2021-07-01 RX ORDER — HYDROCHLOROTHIAZIDE 25 MG/1
25 TABLET ORAL
Qty: 90 TABLET | Refills: 2 | Status: SHIPPED | OUTPATIENT
Start: 2021-07-01 | End: 2022-07-12

## 2021-07-01 RX ORDER — PROPRANOLOL HYDROCHLORIDE 40 MG/1
TABLET ORAL
Qty: 180 TABLET | Refills: 3 | Status: SHIPPED | OUTPATIENT
Start: 2021-07-01 | End: 2022-08-30

## 2021-07-01 ASSESSMENT — PATIENT HEALTH QUESTIONNAIRE - PHQ9: CLINICAL INTERPRETATION OF PHQ2 SCORE: 0

## 2021-07-01 NOTE — PROGRESS NOTES
Virtual Visit: Established Patient   This visit was conducted via Zoom using secure and encrypted videoconferencing technology. The patient was in a private location in the state of [place].    The patient's identity was confirmed and verbal consent was obtained for this virtual visit.    Subjective:   CC:   Chief Complaint   Patient presents with   • Medication Problem     meds for depression pt states he feels fine, some days he feels a little bit of depression   • Fatigue     general fatigue after eating lunch   • Knee Pain     both knee pain, sees Dr Wiley Garcia Israel Dolan is a 60 y.o. male presenting for evaluation and management of:    Prev health visit   Discuss chronic medical conditions overall doing well   Meds to be refilled         ROS   Denies any recent fevers or chills. No nausea or vomiting. No chest pains or shortness of breath.     Allergies   Allergen Reactions   • Pcn [Penicillins]        Current medicines (including changes today)  Current Outpatient Medications   Medication Sig Dispense Refill   • propranolol (INDERAL) 40 MG Tab TAKE 1 TABLET BY MOUTH TWICE A  tablet 3   • sildenafil citrate (VIAGRA) 100 MG tablet 1/4-1 tab po approx 15 min prior to as needed 10 tablet 11   • lisinopril (PRINIVIL) 10 MG Tab Take 1 tablet by mouth every day. 90 tablet 4   • hydroCHLOROthiazide (HYDRODIURIL) 25 MG Tab Take 1 tablet by mouth every day. 90 tablet 2   • escitalopram (LEXAPRO) 20 MG tablet Take 1 tablet by mouth every day. 90 tablet 3   • buPROPion (WELLBUTRIN XL) 300 MG XL tablet Take 1 tablet by mouth every morning. 90 tablet 3   • vitamin D, Ergocalciferol, (DRISDOL) 01201 units Cap capsule Take 50,000 Units by mouth every 7 days.  2     No current facility-administered medications for this visit.       Patient Active Problem List    Diagnosis Date Noted   • Preventative health care 07/01/2021   • Erectile dysfunction 11/24/2020   • Fatigue 06/29/2018   • Colon cancer screening  04/04/2018   • Vitamin D deficiency 10/09/2017   • Obesity (BMI 35.0-39.9 without comorbidity) ([facility]) 10/09/2017   • Vasomotor rhinitis 11/04/2016   • Hyperlipidemia 10/10/2014   • Severe anxiety with panic 01/13/2014   • Hypertension        Family History   Problem Relation Age of Onset   • Heart Disease Father        He  has a past medical history of Chest pain at rest (1/13/2014), Cough (3/1/2016), Depression, HTN, Hyperlipidemia (10/10/2014), Hypertension, and Situational mixed anxiety and depressive disorder (1/13/2014).  He  has a past surgical history that includes tonsillectomy.       Objective:   /85 Comment: pt stated  Pulse 68 Comment: pt stated  Ht 1.829 m (6') Comment: pt stated  Wt 120 kg (265 lb) Comment: pt stated  BMI 35.94 kg/m²     Physical Exam:  Constitutional: Alert, no distress, well-groomed.  Skin: No rashes in visible areas.  Eye: Round. Conjunctiva clear, lids normal. No icterus.   ENMT: Lips pink without lesions, good dentition, moist mucous membranes. Phonation normal.  Neck: No masses, no thyromegaly. Moves freely without pain.  Respiratory: Unlabored respiratory effort, no cough or audible wheeze  Psych: Alert and oriented x3, normal affect and mood.       Assessment and Plan:   The following treatment plan was discussed:     1. Preventative health care  - ABO AND RH DETERMINATION; Future    2. Pure hypertriglyceridemia  - Basic Metabolic Panel; Future  - MICROALBUMIN CREAT RATIO URINE; Future  - Lipid Profile; Future    3. Essential hypertension  - Basic Metabolic Panel; Future  - MICROALBUMIN CREAT RATIO URINE; Future  - Lipid Profile; Future  - propranolol (INDERAL) 40 MG Tab; TAKE 1 TABLET BY MOUTH TWICE A DAY  Dispense: 180 tablet; Refill: 3  - lisinopril (PRINIVIL) 10 MG Tab; Take 1 tablet by mouth every day.  Dispense: 90 tablet; Refill: 4  - hydroCHLOROthiazide (HYDRODIURIL) 25 MG Tab; Take 1 tablet by mouth every day.  Dispense: 90 tablet; Refill: 2    4. Special  screening for malignant neoplasm of prostate  - Prostate Specific Ag Screening; Future    5. Erectile dysfunction, unspecified erectile dysfunction type  - sildenafil citrate (VIAGRA) 100 MG tablet; 1/4-1 tab po approx 15 min prior to as needed  Dispense: 10 tablet; Refill: 11    6. Severe anxiety with panic  - escitalopram (LEXAPRO) 20 MG tablet; Take 1 tablet by mouth every day.  Dispense: 90 tablet; Refill: 3  - buPROPion (WELLBUTRIN XL) 300 MG XL tablet; Take 1 tablet by mouth every morning.  Dispense: 90 tablet; Refill: 3      Problem List Items Addressed This Visit     Hypertension     At goal continue continue current treatment   meds refilled labs ordered             Relevant Medications    propranolol (INDERAL) 40 MG Tab    sildenafil citrate (VIAGRA) 100 MG tablet    lisinopril (PRINIVIL) 10 MG Tab    hydroCHLOROthiazide (HYDRODIURIL) 25 MG Tab    Other Relevant Orders    Basic Metabolic Panel    MICROALBUMIN CREAT RATIO URINE    Lipid Profile    Severe anxiety with panic     Overall well controlled with current medication   these are filled              Relevant Medications    escitalopram (LEXAPRO) 20 MG tablet    buPROPion (WELLBUTRIN XL) 300 MG XL tablet    Hyperlipidemia    Relevant Medications    propranolol (INDERAL) 40 MG Tab    sildenafil citrate (VIAGRA) 100 MG tablet    lisinopril (PRINIVIL) 10 MG Tab    hydroCHLOROthiazide (HYDRODIURIL) 25 MG Tab    Other Relevant Orders    Basic Metabolic Panel    MICROALBUMIN CREAT RATIO URINE    Lipid Profile    Erectile dysfunction    Relevant Medications    sildenafil citrate (VIAGRA) 100 MG tablet    Preventative health care     Age appropriate prev health care discussed   ABO blood type requested may not be covered by insurance                Other Visit Diagnoses     Special screening for malignant neoplasm of prostate        Relevant Orders    Prostate Specific Ag Screening            Follow-up: No follow-ups on file.

## 2021-07-01 NOTE — ASSESSMENT & PLAN NOTE
Age appropriate prev health care discussed   ABO blood type requested may not be covered by insurance

## 2021-07-28 NOTE — LETTER
[facility]  Kaylene Nova M.D.  [address]  [place] [address]  Fax: [phone]   Authorization for Release/Disclosure of   Protected Health Information   Name: JOSE DOLAN : 1960 SSN: [id number]   Address: [address] [place] [address] [place] [address] Phone:    [phone] (home) [phone] (work)   I authorize the entity listed below to release/disclose the PHI below to:   [facility]/Kaylene Nova M.D. and Kaylene Nova M.D.   Provider or Entity Name:     Address   City, State, Zip   Phone:    Fax:   Reason for request: continuity of care   Information to be released:    [  ] LAST COLONOSCOPY,  including any PATH REPORT and follow-up  [  ] LAST FIT/COLOGUARD RESULT [  ] LAST DEXA  [  ] LAST MAMMOGRAM  [  ] LAST PAP  [  ] LAST LABS [  ] RETINA EXAM REPORT  [  ] IMMUNIZATION RECORDS  [  ] Release all info      [  ] Check here and initial the line next to each item to release ALL health information INCLUDING  _____ Care and treatment for drug and / or alcohol abuse  _____ HIV testing, infection status, or AIDS  _____ Genetic Testing    DATES OF SERVICE OR TIME PERIOD TO BE DISCLOSED: _____________  I understand and acknowledge that:  * This Authorization may be revoked at any time by you in writing, except if your health information has already been used or disclosed.  * Your health information that will be used or disclosed as a result of you signing this authorization could be re-disclosed by the recipient. If this occurs, your re-disclosed health information may no longer be protected by State or Federal laws.  * You may refuse to sign this Authorization. Your refusal will not affect your ability to obtain treatment.  * This Authorization becomes effective upon signing and will  on (date) __________.      If no date is indicated, this Authorization will  one (1) year from the signature date.    Name: Jose Dolan    Signature:   Date:     2018            PLEASE FAX REQUESTED RECORDS BACK TO: [phone]   within normal limits

## 2021-08-02 PROBLEM — M17.12 ARTHRITIS OF LEFT KNEE: Status: ACTIVE | Noted: 2021-08-02

## 2021-08-02 PROBLEM — M17.11 ARTHRITIS OF RIGHT KNEE: Status: ACTIVE | Noted: 2021-08-02

## 2021-08-10 ENCOUNTER — HOSPITAL ENCOUNTER (OUTPATIENT)
Dept: LAB | Facility: MEDICAL CENTER | Age: 61
End: 2021-08-10
Attending: FAMILY MEDICINE
Payer: COMMERCIAL

## 2021-08-10 DIAGNOSIS — Z12.5 SPECIAL SCREENING FOR MALIGNANT NEOPLASM OF PROSTATE: ICD-10-CM

## 2021-08-10 DIAGNOSIS — I10 ESSENTIAL HYPERTENSION: ICD-10-CM

## 2021-08-10 DIAGNOSIS — E78.1 PURE HYPERTRIGLYCERIDEMIA: ICD-10-CM

## 2021-08-10 LAB
ANION GAP SERPL CALC-SCNC: 12 MMOL/L (ref 7–16)
BUN SERPL-MCNC: 15 MG/DL (ref 8–22)
CALCIUM SERPL-MCNC: 9.7 MG/DL (ref 8.5–10.5)
CHLORIDE SERPL-SCNC: 99 MMOL/L (ref 96–112)
CHOLEST SERPL-MCNC: 210 MG/DL (ref 100–199)
CO2 SERPL-SCNC: 26 MMOL/L (ref 20–33)
CREAT SERPL-MCNC: 1.03 MG/DL (ref 0.5–1.4)
CREAT UR-MCNC: 163.71 MG/DL
FASTING STATUS PATIENT QL REPORTED: NORMAL
GLUCOSE SERPL-MCNC: 87 MG/DL (ref 65–99)
HDLC SERPL-MCNC: 32 MG/DL
LDLC SERPL CALC-MCNC: 139 MG/DL
MICROALBUMIN UR-MCNC: <1.2 MG/DL
MICROALBUMIN/CREAT UR: NORMAL MG/G (ref 0–30)
POTASSIUM SERPL-SCNC: 4.3 MMOL/L (ref 3.6–5.5)
PSA SERPL-MCNC: 1.34 NG/ML (ref 0–4)
SODIUM SERPL-SCNC: 137 MMOL/L (ref 135–145)
TRIGL SERPL-MCNC: 197 MG/DL (ref 0–149)

## 2021-08-10 PROCEDURE — 80061 LIPID PANEL: CPT

## 2021-08-10 PROCEDURE — 36415 COLL VENOUS BLD VENIPUNCTURE: CPT

## 2021-08-10 PROCEDURE — 84153 ASSAY OF PSA TOTAL: CPT

## 2021-08-10 PROCEDURE — 80048 BASIC METABOLIC PNL TOTAL CA: CPT

## 2021-08-10 PROCEDURE — 82043 UR ALBUMIN QUANTITATIVE: CPT

## 2021-08-10 PROCEDURE — 82570 ASSAY OF URINE CREATININE: CPT

## 2021-08-25 ENCOUNTER — NON-PROVIDER VISIT (OUTPATIENT)
Dept: MEDICAL GROUP | Facility: MEDICAL CENTER | Age: 61
End: 2021-08-25
Payer: COMMERCIAL

## 2021-08-25 DIAGNOSIS — Z23 NEED FOR VACCINATION: ICD-10-CM

## 2021-08-25 PROCEDURE — 90750 HZV VACC RECOMBINANT IM: CPT | Performed by: FAMILY MEDICINE

## 2021-08-25 PROCEDURE — 90471 IMMUNIZATION ADMIN: CPT | Performed by: FAMILY MEDICINE

## 2021-08-25 PROCEDURE — 99999 PR NO CHARGE: CPT | Performed by: FAMILY MEDICINE

## 2021-08-25 NOTE — PROGRESS NOTES
"Willem Dolan is a 61 y.o. male here for a non-provider visit for:   SHINGRIX (Shingles)    Reason for immunization: Overdue/Provider Recommended  Immunization records indicate need for vaccine: Yes, confirmed with Epic and confirmed with [place] [employer]  Minimum interval has been met for this vaccine: Yes  ABN completed: Not Indicated    VIS Dated  Yes was given to patient: Yes  All IAC Questionnaire questions were answered \"No.\"    Patient tolerated injection and no adverse effects were observed or reported: Yes    Pt scheduled for next dose in series: No  "

## 2021-09-15 ENCOUNTER — TELEMEDICINE (OUTPATIENT)
Dept: MEDICAL GROUP | Facility: MEDICAL CENTER | Age: 61
End: 2021-09-15
Payer: COMMERCIAL

## 2021-09-15 VITALS — WEIGHT: 240 LBS | TEMPERATURE: 98 F | HEIGHT: 72 IN | BODY MASS INDEX: 32.51 KG/M2

## 2021-09-15 DIAGNOSIS — F41.0 SEVERE ANXIETY WITH PANIC: ICD-10-CM

## 2021-09-15 DIAGNOSIS — Z71.85 VACCINE COUNSELING: ICD-10-CM

## 2021-09-15 DIAGNOSIS — Z71.3 ENCOUNTER FOR WEIGHT LOSS COUNSELING: ICD-10-CM

## 2021-09-15 PROCEDURE — 99213 OFFICE O/P EST LOW 20 MIN: CPT | Mod: 95 | Performed by: FAMILY MEDICINE

## 2021-09-15 NOTE — ASSESSMENT & PLAN NOTE
He is planning to do covid vaccine so that he can attend his wifes uncles'  who was  for [employer]   Vaccine required   Recommended vaccine

## 2021-09-15 NOTE — ASSESSMENT & PLAN NOTE
Vitals 1/4/2017 4/20/2017 10/9/2017 11/13/2017 4/4/2018   WEIGHT 260 258 255 254.4 260.14     Vitals 6/29/2018 12/7/2018 3/18/2019 5/1/2019 5/19/2020   WEIGHT 256.62 260 265 267.42 245     Vitals 7/15/2020 11/24/2020 7/1/2021 8/2/2021 9/15/2021   WEIGHT 262 256 265 250 240   He has achieved clinically significant weight loss 5-7% we encourage him to continue for wt loss goal of 10-15% initially so that he has room for regain

## 2021-09-15 NOTE — PROGRESS NOTES
Virtual Visit: Established Patient   This visit was conducted via Zoom using secure and encrypted videoconferencing technology.   The patient was in a private location in the state of [place].    The patient's identity was confirmed and verbal consent was obtained for this virtual visit.    Subjective:   CC:   Chief Complaint   Patient presents with   • Immunizations     talking about COVID vx   • Follow-Up     depression meds will interefere?       Jose Dolan is a 61 y.o. male presenting for evaluation and management of:    Would like to discus covid vaccine risk benefit       ROS   No n/v    Current medicines (including changes today)  Current Outpatient Medications   Medication Sig Dispense Refill   • propranolol (INDERAL) 40 MG Tab TAKE 1 TABLET BY MOUTH TWICE A  tablet 3   • sildenafil citrate (VIAGRA) 100 MG tablet 1/4-1 tab po approx 15 min prior to as needed 10 tablet 11   • lisinopril (PRINIVIL) 10 MG Tab Take 1 tablet by mouth every day. 90 tablet 4   • hydroCHLOROthiazide (HYDRODIURIL) 25 MG Tab Take 1 tablet by mouth every day. 90 tablet 2   • escitalopram (LEXAPRO) 20 MG tablet Take 1 tablet by mouth every day. 90 tablet 3   • buPROPion (WELLBUTRIN XL) 300 MG XL tablet Take 1 tablet by mouth every morning. 90 tablet 3   • vitamin D, Ergocalciferol, (DRISDOL) 93756 units Cap capsule Take 50,000 Units by mouth every 7 days.  2     No current facility-administered medications for this visit.       Patient Active Problem List    Diagnosis Date Noted   • Encounter for weight loss counseling 09/15/2021   • Vaccine counseling 09/15/2021   • Arthritis of right knee 08/02/2021   • Arthritis of left knee 08/02/2021   • Preventative health care 07/01/2021   • Erectile dysfunction 11/24/2020   • Fatigue 06/29/2018   • Colon cancer screening 04/04/2018   • Vitamin D deficiency 10/09/2017   • Obesity (BMI 35.0-39.9 without comorbidity) ([facility]) 10/09/2017   • Vasomotor rhinitis 11/04/2016   •  Hyperlipidemia 10/10/2014   • Severe anxiety with panic 2014   • Hypertension         Objective:   Temp 36.7 °C (98 °F) (Temporal) Comment: pt stated  Ht 1.829 m (6') Comment: pt stated  Wt 109 kg (240 lb) Comment: pt stated  BMI 32.55 kg/m²     Physical Exam:  Constitutional: Alert, no distress, well-groomed.  Skin: No rashes in visible areas.  Eye: Round. Conjunctiva clear, lids normal. No icterus.   ENMT: Lips pink without lesions, good dentition, moist mucous membranes. Phonation normal.  Neck: No masses, no thyromegaly. Moves freely without pain.  Respiratory: Unlabored respiratory effort, no cough or audible wheeze  Psych: Alert and oriented x3, normal affect and mood.     Assessment and Plan:   The following treatment plan was discussed:     1. Encounter for weight loss counseling    2. Severe anxiety with panic    3. Vaccine counseling    Problem List Items Addressed This Visit     Severe anxiety with panic     Doing well from this stand point   Continue lexapro and wellbutrin              Encounter for weight loss counseling     Vitals 2017 2017 10/9/2017 2017 2018   WEIGHT 260 258 255 254.4 260.14     Vitals 2018 2018 3/18/2019 2019 2020   WEIGHT 256.62 260 265 267.42 245     Vitals 7/15/2020 2020 2021 2021 9/15/2021   WEIGHT 262 256 265 250 240   He has achieved clinically significant weight loss 5-7% we encourage him to continue for wt loss goal of 10-15% initially so that he has room for regain                Vaccine counseling     He is planning to do covid vaccine so that he can attend his wifes uncles'  who was  for [employer]   Vaccine required   Recommended vaccine                      Follow-up: No follow-ups on file.

## 2022-01-11 ENCOUNTER — OFFICE VISIT (OUTPATIENT)
Dept: MEDICAL GROUP | Facility: MEDICAL CENTER | Age: 62
End: 2022-01-11
Payer: COMMERCIAL

## 2022-01-11 VITALS
RESPIRATION RATE: 16 BRPM | WEIGHT: 260.14 LBS | BODY MASS INDEX: 35.24 KG/M2 | TEMPERATURE: 96.9 F | HEIGHT: 72 IN | DIASTOLIC BLOOD PRESSURE: 80 MMHG | OXYGEN SATURATION: 98 % | SYSTOLIC BLOOD PRESSURE: 128 MMHG | HEART RATE: 63 BPM

## 2022-01-11 DIAGNOSIS — I10 HYPERTENSION, UNSPECIFIED TYPE: ICD-10-CM

## 2022-01-11 DIAGNOSIS — M25.569 KNEE PAIN, UNSPECIFIED CHRONICITY, UNSPECIFIED LATERALITY: ICD-10-CM

## 2022-01-11 DIAGNOSIS — Z00.00 PREVENTATIVE HEALTH CARE: ICD-10-CM

## 2022-01-11 DIAGNOSIS — Z71.3 ENCOUNTER FOR WEIGHT LOSS COUNSELING: ICD-10-CM

## 2022-01-11 DIAGNOSIS — R73.09 ELEVATED GLUCOSE: ICD-10-CM

## 2022-01-11 DIAGNOSIS — F41.0 SEVERE ANXIETY WITH PANIC: ICD-10-CM

## 2022-01-11 DIAGNOSIS — H91.90 HEARING LOSS, UNSPECIFIED HEARING LOSS TYPE, UNSPECIFIED LATERALITY: Primary | ICD-10-CM

## 2022-01-11 PROCEDURE — 99213 OFFICE O/P EST LOW 20 MIN: CPT | Performed by: FAMILY MEDICINE

## 2022-01-12 NOTE — PROGRESS NOTES
This medical record contains text that has been entered with the assistance of computer voice recognition and dictation software.  Therefore, it may contain unintended errors in text, spelling, punctuation, or grammar        Chief Complaint   Patient presents with   • Follow-Up     bp,        CLAIR VALENCIA JR is a 61 y.o. male here evaluation and management of:     Follow up on blood pressure   Would like to discuss wt management       Current Outpatient Medications   Medication Sig Dispense Refill   • propranolol (INDERAL) 40 MG Tab TAKE 1 TABLET BY MOUTH TWICE A  tablet 3   • sildenafil citrate (VIAGRA) 100 MG tablet 1/4-1 tab po approx 15 min prior to as needed 10 tablet 11   • lisinopril (PRINIVIL) 10 MG Tab Take 1 tablet by mouth every day. 90 tablet 4   • hydroCHLOROthiazide (HYDRODIURIL) 25 MG Tab Take 1 tablet by mouth every day. 90 tablet 2   • escitalopram (LEXAPRO) 20 MG tablet Take 1 tablet by mouth every day. 90 tablet 3   • buPROPion (WELLBUTRIN XL) 300 MG XL tablet Take 1 tablet by mouth every morning. 90 tablet 3   • vitamin D, Ergocalciferol, (DRISDOL) 77038 units Cap capsule Take 50,000 Units by mouth every 7 days. (Patient not taking: Reported on 1/11/2022)  2     No current facility-administered medications for this visit.     Patient Active Problem List    Diagnosis Date Noted   • Encounter for weight loss counseling 09/15/2021   • Vaccine counseling 09/15/2021   • Arthritis of right knee 08/02/2021   • Arthritis of left knee 08/02/2021   • Preventative health care 07/01/2021   • Erectile dysfunction 11/24/2020   • Fatigue 06/29/2018   • Colon cancer screening 04/04/2018   • Vitamin D deficiency 10/09/2017   • Obesity (BMI 35.0-39.9 without comorbidity) ([facility]) 10/09/2017   • Vasomotor rhinitis 11/04/2016   • Hyperlipidemia 10/10/2014   • Severe anxiety with panic 01/13/2014   • Hypertension      Past Surgical History:   Procedure Laterality Date   • TONSILLECTOMY        Social History      Tobacco Use   • Smoking status: Never Smoker   • Smokeless tobacco: Never Used   Vaping Use   • Vaping Use: Never used   Substance Use Topics   • Alcohol use: Yes     Alcohol/week: 1.0 oz     Types: 1 Glasses of wine, 1 Cans of beer per week     Comment: Occasionally her   • Drug use: No     Family History   Problem Relation Age of Onset   • Heart Disease Father            ROS    all review of system completed and negative except for those listed above     Objective:     /80 (BP Location: Left arm, Patient Position: Sitting, BP Cuff Size: Adult)   Pulse 63   Temp 36.1 °C (96.9 °F) (Temporal)   Resp 16   Ht 1.829 m (6')   Wt 118 kg (260 lb 2.3 oz)   SpO2 98%  Body mass index is 35.28 kg/m².  Physical Exam:        GEN: comfortable, alert and oriented, well nourished, well developed, in no apparent distress   HEENT: NCAT, eyes: pupils equal and reactive, sclera white, EOMIT, good dentition  HEART: limbs warm and well perfused, regular rate, no JVD, no lower extremity edema  LUNGS: speaking in full sentences, not in apparent respiratory distress, no audible wheezes  MSK: normal tone and bulk, no swelling of the joints, gait steady and normal       Assessment and Plan:   The following treatment plan was discussed        Problem List Items Addressed This Visit     Hypertension     At goal today   Labs reviewed           Relevant Orders    Basic Metabolic Panel    HEMOGLOBIN A1C    Lipid Profile    MICROALBUMIN CREAT RATIO URINE    Severe anxiety with panic     Doing really well on current medications              Encounter for weight loss counseling     H/o htn although well controlled so would avoid stimulant   Options would include contrave vs wegovy   Suspect contrave would be more cost effective   He will contemplate   He has had successful wt loss in the past with meal replacement and he will do again   Already taking wellbutrin    Follow up prn                Other Visit Diagnoses     Hearing loss,  unspecified hearing loss type, unspecified laterality    -  Primary    Relevant Orders    Referral to Audiology    Elevated glucose        Relevant Orders    Basic Metabolic Panel    HEMOGLOBIN A1C    Lipid Profile    MICROALBUMIN CREAT RATIO URINE    Knee pain, unspecified chronicity, unspecified laterality        Relevant Orders    Referral to Sports Medicine    DX-KNEES-AP BILATERAL STANDING                Instructed to follow up if symptoms worsen or fail to improve, ER/UC precautions discussed as well    Kaylene Nova MD  [facility], [address]   [place] [address]  Phone: [phone]

## 2022-01-12 NOTE — ASSESSMENT & PLAN NOTE
H/o htn although well controlled so would avoid stimulant   Options would include contrave vs wegovy   Suspect contrave would be more cost effective   He will contemplate   He has had successful wt loss in the past with meal replacement and he will do again   Already taking wellbutrin    Follow up prn

## 2022-01-26 ENCOUNTER — HOSPITAL ENCOUNTER (OUTPATIENT)
Dept: LAB | Facility: MEDICAL CENTER | Age: 62
End: 2022-01-26
Attending: FAMILY MEDICINE
Payer: COMMERCIAL

## 2022-01-26 DIAGNOSIS — Z00.00 PREVENTATIVE HEALTH CARE: ICD-10-CM

## 2022-01-26 DIAGNOSIS — R73.09 ELEVATED GLUCOSE: ICD-10-CM

## 2022-01-26 DIAGNOSIS — I10 HYPERTENSION, UNSPECIFIED TYPE: ICD-10-CM

## 2022-01-26 LAB
ANION GAP SERPL CALC-SCNC: 10 MMOL/L (ref 7–16)
BUN SERPL-MCNC: 14 MG/DL (ref 8–22)
CALCIUM SERPL-MCNC: 9 MG/DL (ref 8.5–10.5)
CHLORIDE SERPL-SCNC: 104 MMOL/L (ref 96–112)
CHOLEST SERPL-MCNC: 196 MG/DL (ref 100–199)
CO2 SERPL-SCNC: 25 MMOL/L (ref 20–33)
CREAT SERPL-MCNC: 1.02 MG/DL (ref 0.5–1.4)
CREAT UR-MCNC: 191.62 MG/DL
EST. AVERAGE GLUCOSE BLD GHB EST-MCNC: 117 MG/DL
FASTING STATUS PATIENT QL REPORTED: NORMAL
GLUCOSE SERPL-MCNC: 103 MG/DL (ref 65–99)
HBA1C MFR BLD: 5.7 % (ref 4–5.6)
HDLC SERPL-MCNC: 31 MG/DL
LDLC SERPL CALC-MCNC: 135 MG/DL
MICROALBUMIN UR-MCNC: <1.2 MG/DL
MICROALBUMIN/CREAT UR: NORMAL MG/G (ref 0–30)
POTASSIUM SERPL-SCNC: 4.4 MMOL/L (ref 3.6–5.5)
SODIUM SERPL-SCNC: 139 MMOL/L (ref 135–145)
TRIGL SERPL-MCNC: 149 MG/DL (ref 0–149)

## 2022-01-26 PROCEDURE — 83036 HEMOGLOBIN GLYCOSYLATED A1C: CPT

## 2022-01-26 PROCEDURE — 82043 UR ALBUMIN QUANTITATIVE: CPT

## 2022-01-26 PROCEDURE — 82570 ASSAY OF URINE CREATININE: CPT

## 2022-01-26 PROCEDURE — 36415 COLL VENOUS BLD VENIPUNCTURE: CPT

## 2022-01-26 PROCEDURE — 80048 BASIC METABOLIC PNL TOTAL CA: CPT

## 2022-01-26 PROCEDURE — 80061 LIPID PANEL: CPT

## 2022-01-28 ENCOUNTER — OFFICE VISIT (OUTPATIENT)
Dept: MEDICAL GROUP | Facility: MEDICAL CENTER | Age: 62
End: 2022-01-28
Payer: COMMERCIAL

## 2022-01-28 VITALS
OXYGEN SATURATION: 96 % | TEMPERATURE: 97.7 F | RESPIRATION RATE: 16 BRPM | HEIGHT: 72 IN | DIASTOLIC BLOOD PRESSURE: 84 MMHG | BODY MASS INDEX: 36.73 KG/M2 | HEART RATE: 60 BPM | WEIGHT: 271.17 LBS | SYSTOLIC BLOOD PRESSURE: 126 MMHG

## 2022-01-28 DIAGNOSIS — Z71.3 ENCOUNTER FOR WEIGHT LOSS COUNSELING: ICD-10-CM

## 2022-01-28 DIAGNOSIS — E66.9 OBESITY (BMI 35.0-39.9 WITHOUT COMORBIDITY): ICD-10-CM

## 2022-01-28 PROCEDURE — 99214 OFFICE O/P EST MOD 30 MIN: CPT | Performed by: FAMILY MEDICINE

## 2022-01-28 RX ORDER — BUPROPION HYDROCHLORIDE 150 MG/1
150 TABLET ORAL EVERY MORNING
Qty: 90 TABLET | Refills: 3 | Status: SHIPPED | OUTPATIENT
Start: 2022-01-28 | End: 2023-01-31

## 2022-01-28 RX ORDER — NALTREXONE HYDROCHLORIDE AND BUPROPION HYDROCHLORIDE 8; 90 MG/1; MG/1
TABLET, EXTENDED RELEASE ORAL
Qty: 365 TABLET | Refills: 0 | Status: SHIPPED | DISCHARGE
Start: 2022-01-28 | End: 2022-02-01 | Stop reason: SDUPTHER

## 2022-01-28 NOTE — PROGRESS NOTES
This medical record contains text that has been entered with the assistance of computer voice recognition and dictation software.  Therefore, it may contain unintended errors in text, spelling, punctuation, or grammar        Chief Complaint   Patient presents with   • Follow-Up     weight loss: Mahnaz VALENCIA JR is a 61 y.o. male here evaluation and management of:    Discuss initiating medically supervised weight loss       Current Outpatient Medications   Medication Sig Dispense Refill   • Naltrexone-buPROPion HCl ER (CONTRAVE) 8-90 MG TABLET SR 12 HR 1 tab daily for 1 week, take 1 tab bid x 1 week, then take 2 tabs qam and 1 tab qpm and two tabs bid indefinitely 365 Tablet 0   • buPROPion (WELLBUTRIN XL) 150 MG XL tablet Take 1 Tablet by mouth every morning. Lower dose of wellbutrin from 300xl daily to 150xl daily when starting contrave 90 Tablet 3   • propranolol (INDERAL) 40 MG Tab TAKE 1 TABLET BY MOUTH TWICE A  tablet 3   • sildenafil citrate (VIAGRA) 100 MG tablet 1/4-1 tab po approx 15 min prior to as needed 10 tablet 11   • lisinopril (PRINIVIL) 10 MG Tab Take 1 tablet by mouth every day. 90 tablet 4   • hydroCHLOROthiazide (HYDRODIURIL) 25 MG Tab Take 1 tablet by mouth every day. 90 tablet 2   • escitalopram (LEXAPRO) 20 MG tablet Take 1 tablet by mouth every day. 90 tablet 3   • buPROPion (WELLBUTRIN XL) 300 MG XL tablet Take 1 tablet by mouth every morning. 90 tablet 3   • vitamin D, Ergocalciferol, (DRISDOL) 70807 units Cap capsule Take 50,000 Units by mouth every 7 days.  2     No current facility-administered medications for this visit.     Patient Active Problem List    Diagnosis Date Noted   • Encounter for weight loss counseling 09/15/2021   • Vaccine counseling 09/15/2021   • Arthritis of right knee 08/02/2021   • Arthritis of left knee 08/02/2021   • Preventative health care 07/01/2021   • Erectile dysfunction 11/24/2020   • Fatigue 06/29/2018   • Colon cancer screening  04/04/2018   • Vitamin D deficiency 10/09/2017   • Obesity (BMI 35.0-39.9 without comorbidity) ([facility]) 10/09/2017   • Vasomotor rhinitis 11/04/2016   • Hyperlipidemia 10/10/2014   • Severe anxiety with panic 01/13/2014   • Hypertension      Past Surgical History:   Procedure Laterality Date   • TONSILLECTOMY        Social History     Tobacco Use   • Smoking status: Never Smoker   • Smokeless tobacco: Never Used   Vaping Use   • Vaping Use: Never used   Substance Use Topics   • Alcohol use: Yes     Alcohol/week: 1.0 oz     Types: 1 Glasses of wine, 1 Cans of beer per week     Comment: Occasionally her   • Drug use: No     Family History   Problem Relation Age of Onset   • Heart Disease Father            ROS    all review of system completed and negative except for those listed above     Objective:     /84 (BP Location: Left arm, Patient Position: Sitting, BP Cuff Size: Adult)   Pulse 60   Temp 36.5 °C (97.7 °F) (Temporal)   Resp 16   Ht 1.829 m (6')   Wt 123 kg (271 lb 2.7 oz)   SpO2 96%  Body mass index is 36.78 kg/m².  Physical Exam:        GEN: comfortable, alert and oriented, well nourished, well developed, in no apparent distress   HEENT: NCAT, eyes: pupils equal and reactive, sclera white, EOMIT, good dentition  HEART: limbs warm and well perfused, regular rate, no JVD, no lower extremity edema  LUNGS: speaking in full sentences, not in apparent respiratory distress, no audible wheezes  MSK: normal tone and bulk, no swelling of the joints, gait steady and normal         Assessment and Plan:   The following treatment plan was discussed        Problem List Items Addressed This Visit     Obesity (BMI 35.0-39.9 without comorbidity) ([facility])     We will start contrave   And lower wellbutrin   I believe that this will be a good fit for him   He has lost weight in the past successfully   He is contemplative of his diet program   Short term follow up 1-2 mo     Risk benefit discussed     20+ min spent               Relevant Medications    Naltrexone-buPROPion HCl ER (CONTRAVE) 8-90 MG TABLET SR 12 HR    Encounter for weight loss counseling    Relevant Medications    Naltrexone-buPROPion HCl ER (CONTRAVE) 8-90 MG TABLET SR 12 HR    buPROPion (WELLBUTRIN XL) 150 MG XL tablet                Instructed to follow up if symptoms worsen or fail to improve, ER/UC precautions discussed as well    Kaylene Nova MD  [facility], [address]   [place] [address]  Phone: [phone]

## 2022-01-28 NOTE — ASSESSMENT & PLAN NOTE
We will start contrave   And lower wellbutrin   I believe that this will be a good fit for him   He has lost weight in the past successfully   He is contemplative of his diet program   Short term follow up 1-2 mo     Risk benefit discussed     20+ min spent

## 2022-02-01 DIAGNOSIS — Z71.3 ENCOUNTER FOR WEIGHT LOSS COUNSELING: ICD-10-CM

## 2022-02-01 RX ORDER — NALTREXONE HYDROCHLORIDE AND BUPROPION HYDROCHLORIDE 8; 90 MG/1; MG/1
TABLET, EXTENDED RELEASE ORAL
Qty: 365 TABLET | Refills: 0 | Status: SHIPPED | OUTPATIENT
Start: 2022-02-01 | End: 2022-04-25 | Stop reason: SDUPTHER

## 2022-02-01 NOTE — TELEPHONE ENCOUNTER
Pt signed up for the mail order pharmacy.    Contrave is pended to [facility]

## 2022-03-17 ENCOUNTER — OFFICE VISIT (OUTPATIENT)
Dept: MEDICAL GROUP | Facility: MEDICAL CENTER | Age: 62
End: 2022-03-17
Payer: COMMERCIAL

## 2022-03-17 VITALS
HEIGHT: 72 IN | DIASTOLIC BLOOD PRESSURE: 90 MMHG | BODY MASS INDEX: 36.13 KG/M2 | OXYGEN SATURATION: 96 % | TEMPERATURE: 96.7 F | RESPIRATION RATE: 16 BRPM | HEART RATE: 65 BPM | SYSTOLIC BLOOD PRESSURE: 134 MMHG | WEIGHT: 266.76 LBS

## 2022-03-17 DIAGNOSIS — Z71.3 ENCOUNTER FOR WEIGHT LOSS COUNSELING: ICD-10-CM

## 2022-03-17 PROCEDURE — 99213 OFFICE O/P EST LOW 20 MIN: CPT | Performed by: FAMILY MEDICINE

## 2022-03-18 NOTE — PROGRESS NOTES
This medical record contains text that has been entered with the assistance of computer voice recognition and dictation software.  Therefore, it may contain unintended errors in text, spelling, punctuation, or grammar        Chief Complaint   Patient presents with   • Follow-Up     Weight - pt taking contrave. Started taking on 3/1       CLAIR VALENCIA JR is a 61 y.o. male here evaluation and management of:     Follow up wt loss   Succeeding has lost 6 lbs       Current Outpatient Medications   Medication Sig Dispense Refill   • Naltrexone-buPROPion HCl ER (CONTRAVE) 8-90 MG TABLET SR 12 HR 1 tab daily for 1 week, take 1 tab bid x 1 week, then take 2 tabs qam and 1 tab qpm and two tabs bid indefinitely 365 Tablet 0   • buPROPion (WELLBUTRIN XL) 150 MG XL tablet Take 1 Tablet by mouth every morning. Lower dose of wellbutrin from 300xl daily to 150xl daily when starting contrave 90 Tablet 3   • propranolol (INDERAL) 40 MG Tab TAKE 1 TABLET BY MOUTH TWICE A  tablet 3   • sildenafil citrate (VIAGRA) 100 MG tablet 1/4-1 tab po approx 15 min prior to as needed 10 tablet 11   • lisinopril (PRINIVIL) 10 MG Tab Take 1 tablet by mouth every day. 90 tablet 4   • hydroCHLOROthiazide (HYDRODIURIL) 25 MG Tab Take 1 tablet by mouth every day. 90 tablet 2   • escitalopram (LEXAPRO) 20 MG tablet Take 1 tablet by mouth every day. 90 tablet 3   • vitamin D, Ergocalciferol, (DRISDOL) 17739 units Cap capsule Take 50,000 Units by mouth every 7 days.  2     No current facility-administered medications for this visit.     Patient Active Problem List    Diagnosis Date Noted   • Encounter for weight loss counseling 09/15/2021   • Vaccine counseling 09/15/2021   • Arthritis of right knee 08/02/2021   • Arthritis of left knee 08/02/2021   • Preventative health care 07/01/2021   • Erectile dysfunction 11/24/2020   • Fatigue 06/29/2018   • Colon cancer screening 04/04/2018   • Vitamin D deficiency 10/09/2017   • Obesity (BMI 35.0-39.9  without comorbidity) ([facility]) 10/09/2017   • Vasomotor rhinitis 11/04/2016   • Hyperlipidemia 10/10/2014   • Severe anxiety with panic 01/13/2014   • Hypertension      Past Surgical History:   Procedure Laterality Date   • TONSILLECTOMY        Social History     Tobacco Use   • Smoking status: Never Smoker   • Smokeless tobacco: Never Used   Vaping Use   • Vaping Use: Never used   Substance Use Topics   • Alcohol use: Yes     Alcohol/week: 1.0 oz     Types: 1 Glasses of wine, 1 Cans of beer per week     Comment: Occasionally her   • Drug use: No     Family History   Problem Relation Age of Onset   • Heart Disease Father            ROS    all review of system completed and negative except for those listed above     Objective:     /90 (BP Location: Right arm, Patient Position: Sitting, BP Cuff Size: Adult)   Pulse 65   Temp 35.9 °C (96.7 °F) (Temporal)   Resp 16   Ht 1.829 m (6')   Wt 121 kg (266 lb 12.1 oz)   SpO2 96%  Body mass index is 36.18 kg/m².  Physical Exam:    Constitutional: Alert, no distress.  Skin: Warm, dry, good turgor, no rashes in visible areas.  Eye: Equal, round and reactive, conjunctiva clear, lids normal.  ENMT: Lips without lesions, good dentition, oropharynx clear.  Neck: Trachea midline, no masses, no thyromegaly. No cervical or supraclavicular lymphadenopathy.  Respiratory: Unlabored respiratory effort, lungs clear to auscultation, no wheezes, no ronchi.  Cardiovascular: Normal S1, S2, no murmur, no edema.  Abdomen: Soft, non-tender, no masses, no hepatosplenomegaly.  Psych: Alert and oriented x3, normal affect and mood.        Assessment and Plan:   The following treatment plan was discussed        Problem List Items Addressed This Visit     Encounter for weight loss counseling     He stared contrave slightly over 1 week ago   He is not doing a formal diet   Had a lot of success with supplement (non FDA approved) and meal replacement in the past   We chose contrave as he described a  lot of late night eating and also has h/o depression/anxiety   20+ min spent   Follow up 1-2 mo                             Instructed to follow up if symptoms worsen or fail to improve, ER/UC precautions discussed as well    Kaylene Nova MD  [facility], [address]   [place] [address]  Phone: [phone]

## 2022-03-18 NOTE — ASSESSMENT & PLAN NOTE
He stared contrave slightly over 1 week ago   He is not doing a formal diet   Had a lot of success with supplement (non FDA approved) and meal replacement in the past   We chose contrave as he described a lot of late night eating and also has h/o depression/anxiety   30+ min spent   Follow up 1-2 mo

## 2022-04-25 DIAGNOSIS — Z71.3 ENCOUNTER FOR WEIGHT LOSS COUNSELING: ICD-10-CM

## 2022-04-25 NOTE — TELEPHONE ENCOUNTER
Was the patient seen in the last year in this department? Yes   Does patient have an active prescription for medications requested? No   Received Request Via: Pharmacy

## 2022-04-26 RX ORDER — NALTREXONE HYDROCHLORIDE AND BUPROPION HYDROCHLORIDE 8; 90 MG/1; MG/1
TABLET, EXTENDED RELEASE ORAL
Qty: 365 TABLET | Refills: 0 | Status: SHIPPED | OUTPATIENT
Start: 2022-04-26 | End: 2022-07-20 | Stop reason: SDUPTHER

## 2022-05-06 ENCOUNTER — OFFICE VISIT (OUTPATIENT)
Dept: MEDICAL GROUP | Facility: MEDICAL CENTER | Age: 62
End: 2022-05-06
Payer: COMMERCIAL

## 2022-05-06 VITALS
HEIGHT: 72 IN | OXYGEN SATURATION: 100 % | BODY MASS INDEX: 35.24 KG/M2 | WEIGHT: 260.14 LBS | HEART RATE: 67 BPM | DIASTOLIC BLOOD PRESSURE: 86 MMHG | TEMPERATURE: 97.6 F | RESPIRATION RATE: 16 BRPM | SYSTOLIC BLOOD PRESSURE: 126 MMHG

## 2022-05-06 DIAGNOSIS — Z71.3 ENCOUNTER FOR WEIGHT LOSS COUNSELING: ICD-10-CM

## 2022-05-06 PROCEDURE — 99213 OFFICE O/P EST LOW 20 MIN: CPT | Performed by: FAMILY MEDICINE

## 2022-05-06 NOTE — PROGRESS NOTES
This medical record contains text that has been entered with the assistance of computer voice recognition and dictation software.  Therefore, it may contain unintended errors in text, spelling, punctuation, or grammar        Chief Complaint   Patient presents with   • Follow-Up     Weight loss - pt reports going well. Sometimes he feels a little nauseous after taking medication but if he eats a snack or drinks water he does not experience this       CLAIR VALENCIA JR is a 61 y.o. male here evaluation and management of:       Current Outpatient Medications   Medication Sig Dispense Refill   • Naltrexone-buPROPion HCl ER (CONTRAVE) 8-90 MG TABLET SR 12 HR 1 tab daily for 1 week, take 1 tab bid x 1 week, then take 2 tabs qam and 1 tab qpm and two tabs bid indefinitely 365 Tablet 0   • buPROPion (WELLBUTRIN XL) 150 MG XL tablet Take 1 Tablet by mouth every morning. Lower dose of wellbutrin from 300xl daily to 150xl daily when starting contrave 90 Tablet 3   • propranolol (INDERAL) 40 MG Tab TAKE 1 TABLET BY MOUTH TWICE A  tablet 3   • sildenafil citrate (VIAGRA) 100 MG tablet 1/4-1 tab po approx 15 min prior to as needed 10 tablet 11   • lisinopril (PRINIVIL) 10 MG Tab Take 1 tablet by mouth every day. 90 tablet 4   • hydroCHLOROthiazide (HYDRODIURIL) 25 MG Tab Take 1 tablet by mouth every day. 90 tablet 2   • escitalopram (LEXAPRO) 20 MG tablet Take 1 tablet by mouth every day. 90 tablet 3   • vitamin D, Ergocalciferol, (DRISDOL) 04540 units Cap capsule Take 50,000 Units by mouth every 7 days.  2     No current facility-administered medications for this visit.     Patient Active Problem List    Diagnosis Date Noted   • Encounter for weight loss counseling 09/15/2021   • Vaccine counseling 09/15/2021   • Arthritis of right knee 08/02/2021   • Arthritis of left knee 08/02/2021   • Preventative health care 07/01/2021   • Erectile dysfunction 11/24/2020   • Fatigue 06/29/2018   • Colon cancer screening 04/04/2018    • Vitamin D deficiency 10/09/2017   • Obesity (BMI 35.0-39.9 without comorbidity) ([facility]) 10/09/2017   • Vasomotor rhinitis 11/04/2016   • Hyperlipidemia 10/10/2014   • Severe anxiety with panic 01/13/2014   • Hypertension      Past Surgical History:   Procedure Laterality Date   • TONSILLECTOMY        Social History     Tobacco Use   • Smoking status: Never Smoker   • Smokeless tobacco: Never Used   Vaping Use   • Vaping Use: Never used   Substance Use Topics   • Alcohol use: Yes     Alcohol/week: 1.0 oz     Types: 1 Glasses of wine, 1 Cans of beer per week     Comment: Occasionally her   • Drug use: No     Family History   Problem Relation Age of Onset   • Heart Disease Father            ROS    all review of system completed and negative except for those listed above     Objective:     /86 (BP Location: Right arm, Patient Position: Sitting, BP Cuff Size: Adult)   Pulse 67   Temp 36.4 °C (97.6 °F) (Temporal)   Resp 16   Ht 1.829 m (6')   Wt 118 kg (260 lb 2.3 oz)   SpO2 100%  Body mass index is 35.28 kg/m².  Physical Exam:  Vitals 1/28/2022 3/17/2022 5/6/2022   WEIGHT 271.17 266.76 260.14         Constitutional: Alert, no distress.  Skin: Warm, dry, good turgor, no rashes in visible areas.  Eye: Equal, round and reactive, conjunctiva clear, lids normal.  ENMT: Lips without lesions, good dentition, oropharynx clear.  Neck: Trachea midline, no masses, no thyromegaly. No cervical or supraclavicular lymphadenopathy.  Respiratory: Unlabored respiratory effort, lungs clear to auscultation, no wheezes, no ronchi.  Cardiovascular: Normal S1, S2, no murmur, no edema.  Abdomen: Soft, non-tender, no masses, no hepatosplenomegaly.  Psych: Alert and oriented x3, normal affect and mood.          Assessment and Plan:   The following treatment plan was discussed        Problem List Items Addressed This Visit     Encounter for weight loss counseling     Lost another 6 lbs so approx 12 lbs total   Doing contrave   Would  like to continue   Eats breakfast and dinner skips lunch   I do encourage him to consider meal replacement at lunch                Follow up 2 mo  Continue contrave   Having some nausea which could be medication side effect could be gerd and could be other things, I encourage him to pay attention to this and return precuations and strict ER precuations are advised as well           Instructed to follow up if symptoms worsen or fail to improve, ER/UC precautions discussed as well    Kaylene Nova MD  [facility], Family Medicine   [address] [place] [address]   [place] [address]  Phone: [phone]

## 2022-05-06 NOTE — ASSESSMENT & PLAN NOTE
Lost another 6 lbs so approx 12 lbs total   Doing contrave   Would like to continue   Eats breakfast and dinner skips lunch   I do encourage him to consider meal replacement at lunch

## 2022-07-11 DIAGNOSIS — I10 ESSENTIAL HYPERTENSION: ICD-10-CM

## 2022-07-12 RX ORDER — HYDROCHLOROTHIAZIDE 25 MG/1
25 TABLET ORAL
Qty: 90 TABLET | Refills: 2 | Status: SHIPPED | OUTPATIENT
Start: 2022-07-12 | End: 2022-07-19

## 2022-07-19 ENCOUNTER — OFFICE VISIT (OUTPATIENT)
Dept: MEDICAL GROUP | Facility: MEDICAL CENTER | Age: 62
End: 2022-07-19
Payer: COMMERCIAL

## 2022-07-19 VITALS
SYSTOLIC BLOOD PRESSURE: 112 MMHG | WEIGHT: 253.53 LBS | TEMPERATURE: 97.3 F | BODY MASS INDEX: 34.34 KG/M2 | OXYGEN SATURATION: 95 % | HEIGHT: 72 IN | DIASTOLIC BLOOD PRESSURE: 72 MMHG | RESPIRATION RATE: 16 BRPM | HEART RATE: 55 BPM

## 2022-07-19 DIAGNOSIS — Z71.3 ENCOUNTER FOR WEIGHT LOSS COUNSELING: ICD-10-CM

## 2022-07-19 DIAGNOSIS — Z13.1 SCREENING FOR DIABETES MELLITUS (DM): ICD-10-CM

## 2022-07-19 DIAGNOSIS — Z13.6 SCREENING FOR ISCHEMIC HEART DISEASE: ICD-10-CM

## 2022-07-19 DIAGNOSIS — I10 PRIMARY HYPERTENSION: ICD-10-CM

## 2022-07-19 PROCEDURE — 99214 OFFICE O/P EST MOD 30 MIN: CPT | Performed by: FAMILY MEDICINE

## 2022-07-19 NOTE — ASSESSMENT & PLAN NOTE
Vitals 1/28/2022 3/17/2022 5/6/2022 7/19/2022   WEIGHT 271.17 266.76 260.14 253.53       contrave   Modified intermittent fasting   And portion control especially with dinner     Knees feeling better contemplative of using his tredmill or walking again     30+ min spent     Follow up 1-2 mo labs prior

## 2022-07-19 NOTE — ASSESSMENT & PLAN NOTE
Improvement with weight management   Will stop hctz       Follow up 1-2 mo     He can monitor at home too

## 2022-07-19 NOTE — PROGRESS NOTES
This medical record contains text that has been entered with the assistance of computer voice recognition and dictation software.  Therefore, it may contain unintended errors in text, spelling, punctuation, or grammar        Chief Complaint   Patient presents with   • Weight Check   • Other     Pt states he has felt a little light headed today, this morning. He says this happens every once in a while. When he feels light headed he sometimes also feels slight nausea. He ate a piece of toast and it went away. Only happens about once a week       CLARI VALENCIA JR is a 61 y.o. male here evaluation and management of:         Current Outpatient Medications   Medication Sig Dispense Refill   • Naltrexone-buPROPion HCl ER (CONTRAVE) 8-90 MG TABLET SR 12 HR 1 tab daily for 1 week, take 1 tab bid x 1 week, then take 2 tabs qam and 1 tab qpm and two tabs bid indefinitely 365 Tablet 0   • buPROPion (WELLBUTRIN XL) 150 MG XL tablet Take 1 Tablet by mouth every morning. Lower dose of wellbutrin from 300xl daily to 150xl daily when starting contrave 90 Tablet 3   • propranolol (INDERAL) 40 MG Tab TAKE 1 TABLET BY MOUTH TWICE A  tablet 3   • sildenafil citrate (VIAGRA) 100 MG tablet 1/4-1 tab po approx 15 min prior to as needed 10 tablet 11   • lisinopril (PRINIVIL) 10 MG Tab Take 1 tablet by mouth every day. 90 tablet 4   • escitalopram (LEXAPRO) 20 MG tablet Take 1 tablet by mouth every day. 90 tablet 3   • vitamin D, Ergocalciferol, (DRISDOL) 71534 units Cap capsule Take 50,000 Units by mouth every 7 days.  2     No current facility-administered medications for this visit.     Patient Active Problem List    Diagnosis Date Noted   • Encounter for weight loss counseling 09/15/2021   • Vaccine counseling 09/15/2021   • Arthritis of right knee 08/02/2021   • Arthritis of left knee 08/02/2021   • Preventative health care 07/01/2021   • Erectile dysfunction 11/24/2020   • Fatigue 06/29/2018   • Colon cancer screening  04/04/2018   • Vitamin D deficiency 10/09/2017   • Obesity (BMI 35.0-39.9 without comorbidity) ([facility]) 10/09/2017   • Vasomotor rhinitis 11/04/2016   • Hyperlipidemia 10/10/2014   • Severe anxiety with panic 01/13/2014   • Hypertension      Past Surgical History:   Procedure Laterality Date   • TONSILLECTOMY        Social History     Tobacco Use   • Smoking status: Never Smoker   • Smokeless tobacco: Never Used   Vaping Use   • Vaping Use: Never used   Substance Use Topics   • Alcohol use: Yes     Alcohol/week: 1.0 oz     Types: 1 Glasses of wine, 1 Cans of beer per week     Comment: Occasionally her   • Drug use: No     Family History   Problem Relation Age of Onset   • Heart Disease Father            ROS    all review of system completed and negative except for those listed above     Objective:     /72 (BP Location: Left arm, Patient Position: Sitting, BP Cuff Size: Adult)   Pulse (!) 55   Temp 36.3 °C (97.3 °F) (Temporal)   Resp 16   Ht 1.829 m (6')   Wt 115 kg (253 lb 8.5 oz)   SpO2 95%  Body mass index is 34.38 kg/m².  Physical Exam:        GEN: comfortable, alert and oriented, well nourished, well developed, in no apparent distress   HEENT: NCAT, eyes: pupils equal and reactive, sclera white, EOMIT, good dentition  HEART: limbs warm and well perfused, regular rate, no JVD, no lower extremity edema  LUNGS: speaking in full sentences, not in apparent respiratory distress, no audible wheezes  MSK: normal tone and bulk, no swelling of the joints, gait steady and normal       Assessment and Plan:   The following treatment plan was discussed        Problem List Items Addressed This Visit     Hypertension     Improvement with weight management   Will stop hctz       Follow up 1-2 mo     He can monitor at home too                Relevant Orders    Basic Metabolic Panel    Lipid Profile    MICROALBUMIN CREAT RATIO URINE    Encounter for weight loss counseling     Vitals 1/28/2022 3/17/2022 5/6/2022 7/19/2022    WEIGHT 271.17 266.76 260.14 253.53       contrave   Modified intermittent fasting   And portion control especially with dinner     Knees feeling better contemplative of using his tredmill or walking again     30+ min spent     Follow up 1-2 mo labs prior                Relevant Orders    Basic Metabolic Panel    Lipid Profile    MICROALBUMIN CREAT RATIO URINE      Other Visit Diagnoses     Screening for diabetes mellitus (DM)        Relevant Orders    Basic Metabolic Panel    Lipid Profile    MICROALBUMIN CREAT RATIO URINE    Screening for ischemic heart disease        Relevant Orders    Basic Metabolic Panel    Lipid Profile    MICROALBUMIN CREAT RATIO URINE                Instructed to follow up if symptoms worsen or fail to improve, ER/UC precautions discussed as well    Kaylene Nova MD  [facility], Family Medicine   [address] [place] [address] [address]kw[address]   [place] [address]  Phone: [phone]

## 2022-07-20 DIAGNOSIS — Z71.3 ENCOUNTER FOR WEIGHT LOSS COUNSELING: ICD-10-CM

## 2022-07-21 RX ORDER — NALTREXONE HYDROCHLORIDE AND BUPROPION HYDROCHLORIDE 8; 90 MG/1; MG/1
TABLET, EXTENDED RELEASE ORAL
Qty: 365 TABLET | Refills: 0 | Status: SHIPPED | OUTPATIENT
Start: 2022-07-21 | End: 2022-09-22 | Stop reason: SDUPTHER

## 2022-08-13 DIAGNOSIS — I10 ESSENTIAL HYPERTENSION: ICD-10-CM

## 2022-08-16 RX ORDER — LISINOPRIL 10 MG/1
10 TABLET ORAL
Qty: 90 TABLET | Refills: 4 | Status: SHIPPED | OUTPATIENT
Start: 2022-08-16 | End: 2023-09-05

## 2022-08-27 DIAGNOSIS — I10 ESSENTIAL HYPERTENSION: ICD-10-CM

## 2022-08-30 RX ORDER — PROPRANOLOL HYDROCHLORIDE 40 MG/1
TABLET ORAL
Qty: 180 TABLET | Refills: 3 | Status: SHIPPED | OUTPATIENT
Start: 2022-08-30 | End: 2023-10-12

## 2022-09-21 ENCOUNTER — HOSPITAL ENCOUNTER (OUTPATIENT)
Dept: LAB | Facility: MEDICAL CENTER | Age: 62
End: 2022-09-21
Attending: FAMILY MEDICINE
Payer: COMMERCIAL

## 2022-09-21 DIAGNOSIS — Z13.1 SCREENING FOR DIABETES MELLITUS (DM): ICD-10-CM

## 2022-09-21 DIAGNOSIS — Z71.3 ENCOUNTER FOR WEIGHT LOSS COUNSELING: ICD-10-CM

## 2022-09-21 DIAGNOSIS — Z13.6 SCREENING FOR ISCHEMIC HEART DISEASE: ICD-10-CM

## 2022-09-21 DIAGNOSIS — I10 PRIMARY HYPERTENSION: ICD-10-CM

## 2022-09-21 LAB
ANION GAP SERPL CALC-SCNC: 11 MMOL/L (ref 7–16)
BUN SERPL-MCNC: 20 MG/DL (ref 8–22)
CALCIUM SERPL-MCNC: 9 MG/DL (ref 8.5–10.5)
CHLORIDE SERPL-SCNC: 102 MMOL/L (ref 96–112)
CHOLEST SERPL-MCNC: 195 MG/DL (ref 100–199)
CO2 SERPL-SCNC: 27 MMOL/L (ref 20–33)
CREAT SERPL-MCNC: 1.17 MG/DL (ref 0.5–1.4)
CREAT UR-MCNC: 238.94 MG/DL
FASTING STATUS PATIENT QL REPORTED: NORMAL
GFR SERPLBLD CREATININE-BSD FMLA CKD-EPI: 71 ML/MIN/1.73 M 2
GLUCOSE SERPL-MCNC: 96 MG/DL (ref 65–99)
HDLC SERPL-MCNC: 34 MG/DL
LDLC SERPL CALC-MCNC: 133 MG/DL
MICROALBUMIN UR-MCNC: <1.2 MG/DL
MICROALBUMIN/CREAT UR: NORMAL MG/G (ref 0–30)
POTASSIUM SERPL-SCNC: 3.9 MMOL/L (ref 3.6–5.5)
SODIUM SERPL-SCNC: 140 MMOL/L (ref 135–145)
TRIGL SERPL-MCNC: 142 MG/DL (ref 0–149)

## 2022-09-21 PROCEDURE — 80048 BASIC METABOLIC PNL TOTAL CA: CPT

## 2022-09-21 PROCEDURE — 36415 COLL VENOUS BLD VENIPUNCTURE: CPT

## 2022-09-21 PROCEDURE — 80061 LIPID PANEL: CPT

## 2022-09-21 PROCEDURE — 82043 UR ALBUMIN QUANTITATIVE: CPT

## 2022-09-21 PROCEDURE — 82570 ASSAY OF URINE CREATININE: CPT

## 2022-09-22 ENCOUNTER — OFFICE VISIT (OUTPATIENT)
Dept: MEDICAL GROUP | Facility: MEDICAL CENTER | Age: 62
End: 2022-09-22
Payer: COMMERCIAL

## 2022-09-22 VITALS
BODY MASS INDEX: 34.94 KG/M2 | SYSTOLIC BLOOD PRESSURE: 114 MMHG | HEIGHT: 72 IN | OXYGEN SATURATION: 96 % | HEART RATE: 58 BPM | RESPIRATION RATE: 16 BRPM | TEMPERATURE: 96.7 F | DIASTOLIC BLOOD PRESSURE: 70 MMHG | WEIGHT: 257.94 LBS

## 2022-09-22 DIAGNOSIS — R53.83 OTHER FATIGUE: ICD-10-CM

## 2022-09-22 DIAGNOSIS — R06.81 APNEA: ICD-10-CM

## 2022-09-22 DIAGNOSIS — Z71.3 ENCOUNTER FOR WEIGHT LOSS COUNSELING: ICD-10-CM

## 2022-09-22 PROCEDURE — 99214 OFFICE O/P EST MOD 30 MIN: CPT | Performed by: FAMILY MEDICINE

## 2022-09-22 RX ORDER — NALTREXONE HYDROCHLORIDE AND BUPROPION HYDROCHLORIDE 8; 90 MG/1; MG/1
TABLET, EXTENDED RELEASE ORAL
Qty: 365 TABLET | Refills: 1 | Status: SHIPPED | OUTPATIENT
Start: 2022-09-22 | End: 2022-09-23

## 2022-09-22 NOTE — PROGRESS NOTES
This medical record contains text that has been entered with the assistance of computer voice recognition and dictation software.  Therefore, it may contain unintended errors in text, spelling, punctuation, or grammar        Chief Complaint   Patient presents with    Follow-Up     Weight loss.     Other     Question about shingles shot       CLAIR VALENCIA JR is a 62 y.o. male here evaluation and management of:         Current Outpatient Medications   Medication Sig Dispense Refill    Naltrexone-buPROPion HCl ER (CONTRAVE) 8-90 MG TABLET SR 12 HR 1 tab daily for 1 week, take 1 tab bid x 1 week, then take 2 tabs qam and 1 tab qpm and two tabs bid indefinitely 365 Tablet 1    propranolol (INDERAL) 40 MG Tab TAKE 1 TABLET BY MOUTH TWICE A  Tablet 3    lisinopril (PRINIVIL) 10 MG Tab TAKE 1 TABLET BY MOUTH EVERY DAY 90 Tablet 4    buPROPion (WELLBUTRIN XL) 150 MG XL tablet Take 1 Tablet by mouth every morning. Lower dose of wellbutrin from 300xl daily to 150xl daily when starting contrave 90 Tablet 3    sildenafil citrate (VIAGRA) 100 MG tablet 1/4-1 tab po approx 15 min prior to as needed 10 tablet 11    escitalopram (LEXAPRO) 20 MG tablet Take 1 tablet by mouth every day. 90 tablet 3    vitamin D, Ergocalciferol, (DRISDOL) 32806 units Cap capsule Take 50,000 Units by mouth every 7 days.  2     No current facility-administered medications for this visit.     Patient Active Problem List    Diagnosis Date Noted    Encounter for weight loss counseling 09/15/2021    Vaccine counseling 09/15/2021    Arthritis of right knee 08/02/2021    Arthritis of left knee 08/02/2021    Preventative health care 07/01/2021    Erectile dysfunction 11/24/2020    Fatigue 06/29/2018    Colon cancer screening 04/04/2018    Vitamin D deficiency 10/09/2017    Obesity (BMI 35.0-39.9 without comorbidity) (HCC) 10/09/2017    Vasomotor rhinitis 11/04/2016    Hyperlipidemia 10/10/2014    Severe anxiety with panic 01/13/2014    Hypertension       Past Surgical History:   Procedure Laterality Date    TONSILLECTOMY        Social History     Tobacco Use    Smoking status: Never    Smokeless tobacco: Never   Vaping Use    Vaping Use: Never used   Substance Use Topics    Alcohol use: Yes     Alcohol/week: 1.0 oz     Types: 1 Glasses of wine, 1 Cans of beer per week     Comment: Occasionally her    Drug use: No     Family History   Problem Relation Age of Onset    Heart Disease Father            ROS    all review of system completed and negative except for those listed above     Objective:     /70 (BP Location: Right arm, Patient Position: Sitting, BP Cuff Size: Adult)   Pulse (!) 58   Temp 35.9 °C (96.7 °F) (Temporal)   Resp 16   Ht 1.829 m (6')   Wt 117 kg (257 lb 15 oz)   SpO2 96%  Body mass index is 34.98 kg/m².  Physical Exam:    Constitutional: Alert, no distress.  Skin: Warm, dry, good turgor, no rashes in visible areas.  Eye: Equal, round and reactive, conjunctiva clear, lids normal.  ENMT: Lips without lesions, good dentition, oropharynx clear.  Neck: Trachea midline, no masses, no thyromegaly. No cervical or supraclavicular lymphadenopathy.  Respiratory: Unlabored respiratory effort, lungs clear to auscultation, no wheezes, no ronchi.  Cardiovascular: Normal S1, S2, no murmur, no edema.  Abdomen: Soft, non-tender, no masses, no hepatosplenomegaly.  Psych: Alert and oriented x3, normal affect and mood.        Assessment and Plan:   The following treatment plan was discussed        Problem List Items Addressed This Visit       Fatigue    Relevant Orders    Referral to Pulmonary and Sleep Medicine    Encounter for weight loss counseling     Hitting a plateau   Will incorporate 2 meal replacements per day now   Strategies for doing this discussed at length   Continue medication     We discuss exercise goals , will be challenging as he does have knee pain   Strategies like walking on softer surfaces, stationary bike, tredmill with incline      30+ min spent    Labs reviewed              Relevant Medications    Naltrexone-buPROPion HCl ER (CONTRAVE) 8-90 MG TABLET SR 12 HR     Other Visit Diagnoses       Apnea        Relevant Orders    Referral to Pulmonary and Sleep Medicine                  Instructed to follow up if symptoms worsen or fail to improve, ER/UC precautions discussed as well    Kaylene Nova MD  [facility], [facility]   [address] [place] [address] [address]   [place] [address]  Phone: [phone]

## 2022-09-22 NOTE — ASSESSMENT & PLAN NOTE
Hitting a plateau   Will incorporate 2 meal replacements per day now   Strategies for doing this discussed at length   Continue medication     We discuss exercise goals , will be challenging as he does have knee pain   Strategies like walking on softer surfaces, stationary bike, tredmill with incline     30+ min spent    Labs reviewed

## 2022-09-23 RX ORDER — NALTREXONE HYDROCHLORIDE AND BUPROPION HYDROCHLORIDE 8; 90 MG/1; MG/1
TABLET, EXTENDED RELEASE ORAL
Qty: 365 TABLET | Refills: 1 | Status: SHIPPED | OUTPATIENT
Start: 2022-09-23 | End: 2023-01-10 | Stop reason: SDUPTHER

## 2022-09-30 DIAGNOSIS — F41.0 SEVERE ANXIETY WITH PANIC: ICD-10-CM

## 2022-10-04 RX ORDER — ESCITALOPRAM OXALATE 20 MG/1
20 TABLET ORAL
Qty: 90 TABLET | Refills: 3 | Status: SHIPPED | OUTPATIENT
Start: 2022-10-04 | End: 2023-07-27

## 2022-11-04 NOTE — TELEPHONE ENCOUNTER
Received refill request for HCTZ.  Pt was seen in clinic recently and is taking this medication for HTN. Refill sent to pharmacy    Jabari Kramer M.D.       Statement Selected

## 2022-12-08 ENCOUNTER — TELEPHONE (OUTPATIENT)
Dept: MEDICAL GROUP | Facility: MEDICAL CENTER | Age: 62
End: 2022-12-08
Payer: COMMERCIAL

## 2022-12-08 NOTE — TELEPHONE ENCOUNTER
1. Caller Name: CLAIR VALENCIA JR                          Call Back Number: [phone]      How would the patient prefer to be contacted with a response: Phone call OK to leave a detailed message    Received voicemail from pt stating he received his shingles and flu shot yesterday from pharmacy. Yesterday he did have some generalized body aches but today he now has a slight headache localized to the front of his head and it hurts to move his eyes too quickly. He is wondering if this is a normal reaction to the vaccines or something to be evaluated for. Please advise

## 2022-12-09 NOTE — TELEPHONE ENCOUNTER
"Phone Number Called: [phone]    Call outcome: Spoke to patient regarding message below.    Message: RN triaged this patient as advised by PCP. Pt reports headache after flu and shingles immunizations and is concerned because he receives the flu shot every year and this is the first time he has developed a headache afterward. Negative for dyspnea, chest pain, palpitations, fever, urticaria, dizziness, weakness, double/blurry vision, or swelling in the face/throat/injection site. Pt reports there is no noticeable reaction at the injection site, and aside from headache, he feels \"fine.\"   Pt reports he also spoke with his pharmacist after leaving a voicemail with PCP office, and the pharmacist informed him that the flu shot can cause a headache, and this can last for a few days after receiving the vaccination. RN agreed with this information, and patient was relieved to hear this.   RN inquired if patient has tried any over the counter treatments to help his symptoms, pt reports he took some Tylenol OTC after speaking with pharmacist and is noticing relief with this. Pt inquired about other symptoms to watch for, pt educated. Pt advised on ER precautions and encouraged to certainly call back if any other questions or concerns present. Pt verbalized understanding and has no other questions/concerns at this time.     Forwarded to PCP to notify of call outcome.   "

## 2022-12-16 ENCOUNTER — TELEPHONE (OUTPATIENT)
Dept: MEDICAL GROUP | Facility: MEDICAL CENTER | Age: 62
End: 2022-12-16
Payer: COMMERCIAL

## 2022-12-16 NOTE — TELEPHONE ENCOUNTER
1. Caller Name: CLAIR VALENCIA JR                          Call Back Number: [phone] (cell) [phone] (home) [phone] (work)        How would the patient prefer to be contacted with a response: Phone call OK to leave a detailed message    Received call from pt stating for the past month he has been experiencing transient headaches. Localized to the right side of his head. He states he has been having neck pain and was recently adjusted by chiropractor for the neck pain. After this he started experiencing the headaches. They generally resolve if he takes a nap or after waking up in the morning. Pt denies any other symptoms, no palpitations, no numbness or tingling in extremities, no facial drooping, blood pressure reported to be in 130s/80s. Pt is scheduled to see PCP 1/10/22, I did schedule pt for visit on 12/19/22 with Dr Cameron.   Did enforce any changes to the symptoms or any new or worsening symptoms pt needs to be seen at ER/UC

## 2022-12-19 ENCOUNTER — OFFICE VISIT (OUTPATIENT)
Dept: MEDICAL GROUP | Facility: MEDICAL CENTER | Age: 62
End: 2022-12-19
Payer: COMMERCIAL

## 2022-12-19 VITALS
TEMPERATURE: 98 F | OXYGEN SATURATION: 98 % | SYSTOLIC BLOOD PRESSURE: 126 MMHG | HEART RATE: 60 BPM | WEIGHT: 258 LBS | HEIGHT: 72 IN | DIASTOLIC BLOOD PRESSURE: 80 MMHG | BODY MASS INDEX: 34.95 KG/M2

## 2022-12-19 DIAGNOSIS — R51.9 FREQUENT HEADACHES: ICD-10-CM

## 2022-12-19 PROCEDURE — 99213 OFFICE O/P EST LOW 20 MIN: CPT | Performed by: FAMILY MEDICINE

## 2022-12-19 ASSESSMENT — PATIENT HEALTH QUESTIONNAIRE - PHQ9: CLINICAL INTERPRETATION OF PHQ2 SCORE: 0

## 2022-12-19 NOTE — ASSESSMENT & PLAN NOTE
New problem.   Patient notes he was having neck pain assocaited with right sided headaches  Resolved with chiropractric treatment for neck pain  However, now having neck pain again and notes headaches again.   Worse with forward flexion of the neck.   Improves with putting neck in a neutral position.   No visual changes  Normal BP  Comes and goes. Can last all night. No nausea or vomiting.   Tylenol and motrin are both helpful, can hattie the HA all together.   Denies any neurological symptoms.   No numbness, tingling or weakness.   Heat on the neck seems to help quite a bit.

## 2022-12-20 NOTE — PROGRESS NOTES
Subjective:     CC: The encounter diagnosis was Frequent headaches.    HPI: Patient is a 62 y.o. male established patient who presents today with concern for headaches.      Frequent headaches  New problem.   Patient notes he was having neck pain assocaited with right sided headaches  Resolved with chiropractric treatment for neck pain  However, now having neck pain again and notes headaches again.   Worse with forward flexion of the neck.   Improves with putting neck in a neutral position.   No visual changes  Normal BP  Comes and goes. Can last all night. No nausea or vomiting.   Tylenol and motrin are both helpful, can hattie the HA all together.   Denies any neurological symptoms.   No numbness, tingling or weakness.   Heat on the neck seems to help quite a bit.     Past Medical History:   Diagnosis Date    Chest pain at rest 1/13/2014    Cough 3/1/2016    Depression     HTN     Hyperlipidemia 10/10/2014    Hypertension     Situational mixed anxiety and depressive disorder 1/13/2014       Social History     Tobacco Use    Smoking status: Never    Smokeless tobacco: Never   Vaping Use    Vaping Use: Never used   Substance Use Topics    Alcohol use: Yes     Alcohol/week: 1.0 oz     Types: 1 Glasses of wine, 1 Cans of beer per week     Comment: Occasionally her    Drug use: No       Current Outpatient Medications Ordered in Epic   Medication Sig Dispense Refill    escitalopram (LEXAPRO) 20 MG tablet TAKE 1 TABLET BY MOUTH EVERY DAY 90 Tablet 3    Naltrexone-buPROPion HCl ER (CONTRAVE) 8-90 MG TABLET SR 12 HR 1 TAB BY MOUTH DAILY FOR 1 WEEK, TAKE 1 TAB TWICE A DAY X 1 WEEK, THEN TAKE 2 TABS EVERY MORNING AND 1 TAB EVERY EVENING AND TWO TABS TWICE A DAY INDEFINITELY 365 Tablet 1    propranolol (INDERAL) 40 MG Tab TAKE 1 TABLET BY MOUTH TWICE A  Tablet 3    lisinopril (PRINIVIL) 10 MG Tab TAKE 1 TABLET BY MOUTH EVERY DAY 90 Tablet 4    buPROPion (WELLBUTRIN XL) 150 MG XL tablet Take 1 Tablet by mouth every  morning. Lower dose of wellbutrin from 300xl daily to 150xl daily when starting contrave 90 Tablet 3    sildenafil citrate (VIAGRA) 100 MG tablet 1/4-1 tab po approx 15 min prior to as needed 10 tablet 11    vitamin D, Ergocalciferol, (DRISDOL) 31900 units Cap capsule Take 50,000 Units by mouth every 7 days.  2     No current Epic-ordered facility-administered medications on file.       Allergies:  Pcn [penicillins]    Health Maintenance: Completed      Objective:       Exam:  /80 (BP Location: Left arm, Patient Position: Sitting, BP Cuff Size: Adult long)   Pulse 60   Temp 36.7 °C (98 °F) (Temporal)   Ht 1.829 m (6')   Wt 117 kg (258 lb)   SpO2 98%   BMI 34.99 kg/m²  Body mass index is 34.99 kg/m².      General: Normal appearing. No distress.  HEAD: NCAT  EYES: conjunctiva clear, lids without ptosis, pupils equal  and reactive to light  EARS: ears normal shape and contour, canals are clear bilaterally, TMs clear  Neck: Supple without masses. Thyroid is not enlarged. Normal ROM  Pulmonary: Clear to ausculation.  Normal effort. No rales, ronchi, or wheezing.  Cardiovascular: Regular rate and rhythm, no murmur. No LE edema  Neurologic: Grossly normal, no focal deficits  Lymph: No cervical or supraclavicular lymph nodes are palpable  Skin: Warm and dry.  No obvious lesions.  Musculoskeletal: Normal gait and station.   Psych: Normal mood and affect. Alert and oriented x3. Judgment and insight is normal.       Labs: 9/21/2022 results reviewed and discussed with the patient, questions answered.    Assessment & Plan:     62 y.o. male with the following -     1. Frequent headaches  Problem.  Per the patient's history, headaches seem to be closely associated with neck pain resolved after chiropractic care.  Neck pain has restarted, and headaches have restarted.  No red flag symptoms.  Plan to hold off on imaging for now.  He has a follow-up scheduled with his PCP in 3 weeks.  If chiropractic care does not resolve  the issue would recommend imaging at that time.      Return in about 3 weeks (around 1/9/2023).    Please note that this dictation was created using voice recognition software. I have made every reasonable attempt to correct obvious errors, but I expect that there are errors of grammar and possibly content that I did not discover before finalizing the note.

## 2023-01-10 ENCOUNTER — OFFICE VISIT (OUTPATIENT)
Dept: MEDICAL GROUP | Facility: MEDICAL CENTER | Age: 63
End: 2023-01-10
Payer: COMMERCIAL

## 2023-01-10 VITALS
SYSTOLIC BLOOD PRESSURE: 132 MMHG | RESPIRATION RATE: 16 BRPM | BODY MASS INDEX: 35.83 KG/M2 | HEART RATE: 65 BPM | OXYGEN SATURATION: 95 % | HEIGHT: 72 IN | TEMPERATURE: 97.2 F | DIASTOLIC BLOOD PRESSURE: 88 MMHG | WEIGHT: 264.55 LBS

## 2023-01-10 DIAGNOSIS — R26.89 BALANCE PROBLEMS: ICD-10-CM

## 2023-01-10 DIAGNOSIS — G44.86 CERVICOGENIC HEADACHE: Primary | ICD-10-CM

## 2023-01-10 DIAGNOSIS — Z00.00 PREVENTATIVE HEALTH CARE: ICD-10-CM

## 2023-01-10 DIAGNOSIS — R11.0 NAUSEA: ICD-10-CM

## 2023-01-10 DIAGNOSIS — R51.9 FREQUENT HEADACHES: ICD-10-CM

## 2023-01-10 DIAGNOSIS — S16.1XXS STRAIN OF NECK MUSCLE, SEQUELA: ICD-10-CM

## 2023-01-10 DIAGNOSIS — Z71.3 ENCOUNTER FOR WEIGHT LOSS COUNSELING: ICD-10-CM

## 2023-01-10 PROCEDURE — 99214 OFFICE O/P EST MOD 30 MIN: CPT | Performed by: FAMILY MEDICINE

## 2023-01-10 RX ORDER — NALTREXONE HYDROCHLORIDE AND BUPROPION HYDROCHLORIDE 8; 90 MG/1; MG/1
TABLET, EXTENDED RELEASE ORAL
Qty: 365 TABLET | Refills: 1 | Status: SHIPPED | OUTPATIENT
Start: 2023-01-10 | End: 2023-07-06 | Stop reason: SDUPTHER

## 2023-01-10 NOTE — ASSESSMENT & PLAN NOTE
Neck strain and cervicogenic headache   Doing chiropracter   Add on PT   Follow up with me or consult with physiatry x ray prior if not imroved     30+ min spent

## 2023-01-10 NOTE — PROGRESS NOTES
This medical record contains text that has been entered with the assistance of computer voice recognition and dictation software.  Therefore, it may contain unintended errors in text, spelling, punctuation, or grammar        Chief Complaint   Patient presents with    Follow-Up     Pt sometimes experiences neck stiffness and headaches specifically on the left side.  Pt reports change in balance sometimes while he is walking as well as dragging his feet.       CLAIR VALENCIA JR is a 62 y.o. male here evaluation and management of:           Current Outpatient Medications   Medication Sig Dispense Refill    Naltrexone-buPROPion HCl ER (CONTRAVE) 8-90 MG TABLET SR 12 HR 1 TAB BY MOUTH DAILY FOR 1 WEEK, TAKE 1 TAB TWICE A DAY X 1 WEEK, THEN TAKE 2 TABS EVERY MORNING AND 1 TAB EVERY EVENING AND TWO TABS TWICE A DAY INDEFINITELY 365 Tablet 1    escitalopram (LEXAPRO) 20 MG tablet TAKE 1 TABLET BY MOUTH EVERY DAY 90 Tablet 3    propranolol (INDERAL) 40 MG Tab TAKE 1 TABLET BY MOUTH TWICE A  Tablet 3    lisinopril (PRINIVIL) 10 MG Tab TAKE 1 TABLET BY MOUTH EVERY DAY 90 Tablet 4    buPROPion (WELLBUTRIN XL) 150 MG XL tablet Take 1 Tablet by mouth every morning. Lower dose of wellbutrin from 300xl daily to 150xl daily when starting contrave 90 Tablet 3    sildenafil citrate (VIAGRA) 100 MG tablet 1/4-1 tab po approx 15 min prior to as needed 10 tablet 11    vitamin D, Ergocalciferol, (DRISDOL) 07616 units Cap capsule Take 50,000 Units by mouth every 7 days.  2     No current facility-administered medications for this visit.     Patient Active Problem List    Diagnosis Date Noted    Balance problems 01/10/2023    Nausea 01/10/2023    Frequent headaches 12/19/2022    Encounter for weight loss counseling 09/15/2021    Vaccine counseling 09/15/2021    Arthritis of right knee 08/02/2021    Arthritis of left knee 08/02/2021    Preventative health care 07/01/2021    Erectile dysfunction 11/24/2020    Fatigue 06/29/2018     Colon cancer screening 04/04/2018    Vitamin D deficiency 10/09/2017    Obesity (BMI 35.0-39.9 without comorbidity) (HCC) 10/09/2017    Vasomotor rhinitis 11/04/2016    Hyperlipidemia 10/10/2014    Severe anxiety with panic 01/13/2014    Hypertension      Past Surgical History:   Procedure Laterality Date    TONSILLECTOMY        Social History     Tobacco Use    Smoking status: Never    Smokeless tobacco: Never   Vaping Use    Vaping Use: Never used   Substance Use Topics    Alcohol use: Yes     Alcohol/week: 1.0 oz     Types: 1 Glasses of wine, 1 Cans of beer per week     Comment: Occasionally her    Drug use: No     Family History   Problem Relation Age of Onset    Heart Disease Father            ROS    all review of system completed and negative except for those listed above     Objective:     /88 (BP Location: Left arm, Patient Position: Sitting, BP Cuff Size: Adult)   Pulse 65   Temp 36.2 °C (97.2 °F) (Temporal)   Resp 16   Ht 1.829 m (6')   Wt 120 kg (264 lb 8.8 oz)   SpO2 95%  Body mass index is 35.88 kg/m².  Physical Exam:    Constitutional: Alert, no distress.  Skin: Warm, dry, good turgor, no rashes in visible areas.  Eye: Equal, round and reactive, conjunctiva clear, lids normal.  ENMT: Lips without lesions, good dentition, oropharynx clear.  Neck: Trachea midline, no masses, no thyromegaly. No cervical or supraclavicular lymphadenopathy.  Respiratory: Unlabored respiratory effort, lungs clear to auscultation, no wheezes, no ronchi.  Cardiovascular: Normal S1, S2, no murmur, no edema.  Abdomen: Soft, non-tender, no masses, no hepatosplenomegaly.  Psych: Alert and oriented x3, normal affect and mood.          Assessment and Plan:   The following treatment plan was discussed        Problem List Items Addressed This Visit       Preventative health care     Will check labs   Follow up 3 mo prior              Relevant Orders    Lipid Profile    Basic Metabolic Panel    IRON/TOTAL IRON BIND    TSH     VITAMIN B12    VITAMIN B1    Encounter for weight loss counseling     Maintaining   Had some relapse during the holiday   Continue contrave             Relevant Medications    Naltrexone-buPROPion HCl ER (CONTRAVE) 8-90 MG TABLET SR 12 HR    Other Relevant Orders    Lipid Profile    Basic Metabolic Panel    IRON/TOTAL IRON BIND    TSH    VITAMIN B12    VITAMIN B1    Frequent headaches     Neck strain and cervicogenic headache   Doing chiropracter   Add on PT   Follow up with me or consult with physiatry x ray prior if not imroved     30+ min spent                  Balance problems     Will check labs   Will also work on this with PT     30+ min spent              Relevant Orders    Lipid Profile    Basic Metabolic Panel    IRON/TOTAL IRON BIND    TSH    VITAMIN B12    VITAMIN B1    Nausea     Will check h pylori   Low threshold to start ppi              Relevant Orders    H.PYLORI STOOL ANTIGEN     Other Visit Diagnoses       Cervicogenic headache    -  Primary    Relevant Orders    Referral to Physical Therapy    DX-CERVICAL SPINE-2 OR 3 VIEWS    Referral to Pain Clinic    Strain of neck muscle, sequela        Relevant Orders    Referral to Physical Therapy    DX-CERVICAL SPINE-2 OR 3 VIEWS    Referral to Pain Clinic                  Instructed to follow up if symptoms worsen or fail to improve, ER/UC precautions discussed as well    Kaylene Nova MD  [facility], Family Medicine   [address] [place] [address]   [place] [address]  Phone: [phone]

## 2023-01-12 ENCOUNTER — HOSPITAL ENCOUNTER (OUTPATIENT)
Dept: RADIOLOGY | Facility: MEDICAL CENTER | Age: 63
End: 2023-01-12
Attending: FAMILY MEDICINE
Payer: COMMERCIAL

## 2023-01-12 DIAGNOSIS — G44.86 CERVICOGENIC HEADACHE: ICD-10-CM

## 2023-01-12 DIAGNOSIS — S16.1XXS STRAIN OF NECK MUSCLE, SEQUELA: ICD-10-CM

## 2023-01-12 PROCEDURE — 72040 X-RAY EXAM NECK SPINE 2-3 VW: CPT

## 2023-01-16 ENCOUNTER — TELEPHONE (OUTPATIENT)
Dept: MEDICAL GROUP | Facility: MEDICAL CENTER | Age: 63
End: 2023-01-16
Payer: COMMERCIAL

## 2023-01-16 DIAGNOSIS — H91.90 HEARING LOSS, UNSPECIFIED HEARING LOSS TYPE, UNSPECIFIED LATERALITY: ICD-10-CM

## 2023-01-16 NOTE — TELEPHONE ENCOUNTER
2. SPECIFIC Action To Be Taken: Orders pending, please sign.    3. Diagnosis/Clinical Reason for Request: Hearing test    4. Specialty & Provider Name/Lab/Imaging Location: [facility]    5. Is appointment scheduled for requested order/referral: no

## 2023-01-30 DIAGNOSIS — Z71.3 ENCOUNTER FOR WEIGHT LOSS COUNSELING: ICD-10-CM

## 2023-01-31 RX ORDER — BUPROPION HYDROCHLORIDE 150 MG/1
TABLET ORAL
Qty: 90 TABLET | Refills: 3 | Status: SHIPPED | OUTPATIENT
Start: 2023-01-31 | End: 2023-10-24 | Stop reason: SDUPTHER

## 2023-04-11 ENCOUNTER — HOSPITAL ENCOUNTER (OUTPATIENT)
Dept: LAB | Facility: MEDICAL CENTER | Age: 63
End: 2023-04-11
Attending: FAMILY MEDICINE
Payer: COMMERCIAL

## 2023-04-11 DIAGNOSIS — R11.0 NAUSEA: ICD-10-CM

## 2023-04-11 DIAGNOSIS — R26.89 BALANCE PROBLEMS: ICD-10-CM

## 2023-04-11 DIAGNOSIS — Z00.00 PREVENTATIVE HEALTH CARE: ICD-10-CM

## 2023-04-11 DIAGNOSIS — Z71.3 ENCOUNTER FOR WEIGHT LOSS COUNSELING: ICD-10-CM

## 2023-04-11 LAB
ANION GAP SERPL CALC-SCNC: 11 MMOL/L (ref 7–16)
BUN SERPL-MCNC: 20 MG/DL (ref 8–22)
CALCIUM SERPL-MCNC: 8.9 MG/DL (ref 8.5–10.5)
CHLORIDE SERPL-SCNC: 104 MMOL/L (ref 96–112)
CHOLEST SERPL-MCNC: 210 MG/DL (ref 100–199)
CO2 SERPL-SCNC: 24 MMOL/L (ref 20–33)
CREAT SERPL-MCNC: 0.96 MG/DL (ref 0.5–1.4)
FASTING STATUS PATIENT QL REPORTED: NORMAL
GFR SERPLBLD CREATININE-BSD FMLA CKD-EPI: 90 ML/MIN/1.73 M 2
GLUCOSE SERPL-MCNC: 75 MG/DL (ref 65–99)
H PYLORI AG STL QL IA: NOT DETECTED
HDLC SERPL-MCNC: 34 MG/DL
IRON SATN MFR SERPL: 30 % (ref 15–55)
IRON SERPL-MCNC: 82 UG/DL (ref 50–180)
LDLC SERPL CALC-MCNC: 155 MG/DL
POTASSIUM SERPL-SCNC: 4 MMOL/L (ref 3.6–5.5)
SODIUM SERPL-SCNC: 139 MMOL/L (ref 135–145)
TIBC SERPL-MCNC: 269 UG/DL (ref 250–450)
TRIGL SERPL-MCNC: 107 MG/DL (ref 0–149)
TSH SERPL DL<=0.005 MIU/L-ACNC: 3.76 UIU/ML (ref 0.38–5.33)
UIBC SERPL-MCNC: 187 UG/DL (ref 110–370)
VIT B12 SERPL-MCNC: 337 PG/ML (ref 211–911)

## 2023-04-11 PROCEDURE — 87338 HPYLORI STOOL AG IA: CPT

## 2023-04-11 PROCEDURE — 83540 ASSAY OF IRON: CPT

## 2023-04-11 PROCEDURE — 80061 LIPID PANEL: CPT

## 2023-04-11 PROCEDURE — 83550 IRON BINDING TEST: CPT

## 2023-04-11 PROCEDURE — 36415 COLL VENOUS BLD VENIPUNCTURE: CPT

## 2023-04-11 PROCEDURE — 80048 BASIC METABOLIC PNL TOTAL CA: CPT

## 2023-04-11 PROCEDURE — 84425 ASSAY OF VITAMIN B-1: CPT

## 2023-04-11 PROCEDURE — 82607 VITAMIN B-12: CPT

## 2023-04-11 PROCEDURE — 84443 ASSAY THYROID STIM HORMONE: CPT

## 2023-04-13 ENCOUNTER — TELEPHONE (OUTPATIENT)
Dept: MEDICAL GROUP | Facility: MEDICAL CENTER | Age: 63
End: 2023-04-13

## 2023-04-13 ENCOUNTER — OFFICE VISIT (OUTPATIENT)
Dept: MEDICAL GROUP | Facility: MEDICAL CENTER | Age: 63
End: 2023-04-13
Payer: COMMERCIAL

## 2023-04-13 VITALS
TEMPERATURE: 97.1 F | WEIGHT: 264.55 LBS | OXYGEN SATURATION: 97 % | RESPIRATION RATE: 16 BRPM | HEIGHT: 72 IN | DIASTOLIC BLOOD PRESSURE: 76 MMHG | BODY MASS INDEX: 35.83 KG/M2 | SYSTOLIC BLOOD PRESSURE: 132 MMHG | HEART RATE: 55 BPM

## 2023-04-13 DIAGNOSIS — R26.89 BALANCE PROBLEMS: ICD-10-CM

## 2023-04-13 DIAGNOSIS — Z71.3 ENCOUNTER FOR WEIGHT LOSS COUNSELING: ICD-10-CM

## 2023-04-13 DIAGNOSIS — E88.819 INSULIN RESISTANCE: ICD-10-CM

## 2023-04-13 DIAGNOSIS — E78.5 DYSLIPIDEMIA: ICD-10-CM

## 2023-04-13 DIAGNOSIS — F32.A DEPRESSION, UNSPECIFIED DEPRESSION TYPE: ICD-10-CM

## 2023-04-13 DIAGNOSIS — E65 ABDOMINAL OBESITY: ICD-10-CM

## 2023-04-13 DIAGNOSIS — Z81.8 FAMILY HISTORY OF DEMENTIA: ICD-10-CM

## 2023-04-13 DIAGNOSIS — E66.9 OBESITY (BMI 35.0-39.9 WITHOUT COMORBIDITY): ICD-10-CM

## 2023-04-13 DIAGNOSIS — E88.810 METABOLIC SYNDROME: ICD-10-CM

## 2023-04-13 DIAGNOSIS — M19.90 OSTEOARTHRITIS, UNSPECIFIED OSTEOARTHRITIS TYPE, UNSPECIFIED SITE: ICD-10-CM

## 2023-04-13 DIAGNOSIS — I10 PRIMARY HYPERTENSION: ICD-10-CM

## 2023-04-13 DIAGNOSIS — M47.812 CERVICAL SPONDYLOSIS: ICD-10-CM

## 2023-04-13 PROCEDURE — 99214 OFFICE O/P EST MOD 30 MIN: CPT | Performed by: FAMILY MEDICINE

## 2023-04-13 RX ORDER — SEMAGLUTIDE 0.25 MG/.5ML
0.25 INJECTION, SOLUTION SUBCUTANEOUS
Qty: 4 EACH | Refills: 2 | Status: SHIPPED | OUTPATIENT
Start: 2023-04-13 | End: 2023-07-12

## 2023-04-13 ASSESSMENT — PATIENT HEALTH QUESTIONNAIRE - PHQ9: CLINICAL INTERPRETATION OF PHQ2 SCORE: 0

## 2023-04-13 NOTE — ASSESSMENT & PLAN NOTE
We checked labs   He is open to neurology consultation   Denies vertigo   Denies orthostatic symptoms

## 2023-04-13 NOTE — ASSESSMENT & PLAN NOTE
Maintaining overall    Continue contrave    Nutrition and exercise discussed in detail     Will check coverage for wegovy   See associated dx codes   Metabolic syndrome   Insulin resistance  Abdominal obesity   Depression   OA   dyslipidideima   htn   BMI >35    We can also include in previously tried medications contrave   He has been complying with nutrition and exercise plan for well over a year     He has also tried mail order stimulant/sympathomametic in the past   He has tried meal replacement in the past     30+ min spent

## 2023-04-13 NOTE — PROGRESS NOTES
This medical record contains text that has been entered with the assistance of computer voice recognition and dictation software.  Therefore, it may contain unintended errors in text, spelling, punctuation, or grammar        Chief Complaint   Patient presents with    Follow-Up     Weight loss.  Recently pt has been experiencing loss of balance after bending down to get something, also noticed feeling off balance while walking as well       CLAIR VALENCIA JR is a 62 y.o. male here evaluation and management of:       Current Outpatient Medications   Medication Sig Dispense Refill    Semaglutide (WEGOVY) 0.25 MG/0.5ML Solution Auto-injector Pen-injector Inject 0.5 mL under the skin every 7 days for 90 days. 4 Each 2    buPROPion (WELLBUTRIN XL) 150 MG XL tablet TAKE 1 TAB BY MOUTH EVERY MORNING. LOWER DOSE OF WELLBUTRIN FROM 300XL DAILY TO 150XL DAILY WHEN STARTING CONTRAVE 90 Tablet 3    Naltrexone-buPROPion HCl ER (CONTRAVE) 8-90 MG TABLET SR 12 HR 1 TAB BY MOUTH DAILY FOR 1 WEEK, TAKE 1 TAB TWICE A DAY X 1 WEEK, THEN TAKE 2 TABS EVERY MORNING AND 1 TAB EVERY EVENING AND TWO TABS TWICE A DAY INDEFINITELY 365 Tablet 1    escitalopram (LEXAPRO) 20 MG tablet TAKE 1 TABLET BY MOUTH EVERY DAY 90 Tablet 3    propranolol (INDERAL) 40 MG Tab TAKE 1 TABLET BY MOUTH TWICE A  Tablet 3    lisinopril (PRINIVIL) 10 MG Tab TAKE 1 TABLET BY MOUTH EVERY DAY 90 Tablet 4    sildenafil citrate (VIAGRA) 100 MG tablet 1/4-1 tab po approx 15 min prior to as needed 10 tablet 11    vitamin D, Ergocalciferol, (DRISDOL) 38981 units Cap capsule Take 50,000 Units by mouth every 7 days.  2     No current facility-administered medications for this visit.     Patient Active Problem List    Diagnosis Date Noted    Balance problems 01/10/2023    Nausea 01/10/2023    Frequent headaches 12/19/2022    Encounter for weight loss counseling 09/15/2021    Vaccine counseling 09/15/2021    Arthritis of right knee 08/02/2021    Arthritis of left knee  08/02/2021    Preventative health care 07/01/2021    Erectile dysfunction 11/24/2020    Fatigue 06/29/2018    Colon cancer screening 04/04/2018    Vitamin D deficiency 10/09/2017    Obesity (BMI 35.0-39.9 without comorbidity) (HCC) 10/09/2017    Vasomotor rhinitis 11/04/2016    Hyperlipidemia 10/10/2014    Severe anxiety with panic 01/13/2014    Hypertension      Past Surgical History:   Procedure Laterality Date    TONSILLECTOMY        Social History     Tobacco Use    Smoking status: Never    Smokeless tobacco: Never   Vaping Use    Vaping Use: Never used   Substance Use Topics    Alcohol use: Yes     Alcohol/week: 1.0 oz     Types: 1 Glasses of wine, 1 Cans of beer per week     Comment: occasionally    Drug use: No     Family History   Problem Relation Age of Onset    Heart Disease Father            ROS    all review of system completed and negative except for those listed above     Objective:     /76 (BP Location: Left arm, Patient Position: Sitting, BP Cuff Size: Adult long)   Pulse (!) 55   Temp 36.2 °C (97.1 °F) (Temporal)   Resp 16   Ht 1.829 m (6')   Wt 120 kg (264 lb 8.8 oz)   SpO2 97%  Body mass index is 35.88 kg/m².  Physical Exam:    Constitutional: Alert, no distress.  Skin: Warm, dry, good turgor, no rashes in visible areas.  Eye: Equal, round and reactive, conjunctiva clear, lids normal.  ENMT: Lips without lesions, good dentition, oropharynx clear.  Neck: Trachea midline, no masses, no thyromegaly. No cervical or supraclavicular lymphadenopathy.  Respiratory: Unlabored respiratory effort, lungs clear to auscultation, no wheezes, no ronchi.  Cardiovascular: Normal S1, S2, no murmur, no edema.  Abdomen: Soft, non-tender, no masses, no hepatosplenomegaly.  Psych: Alert and oriented x3, normal affect and mood.      Labs reviewed       Assessment and Plan:   The following treatment plan was discussed        Problem List Items Addressed This Visit       Hypertension    Relevant Medications     Semaglutide (WEGOVY) 0.25 MG/0.5ML Solution Auto-injector Pen-injector    Obesity (BMI 35.0-39.9 without comorbidity) (HCC)    Relevant Medications    Semaglutide (WEGOVY) 0.25 MG/0.5ML Solution Auto-injector Pen-injector    Encounter for weight loss counseling     Maintaining overall    Continue contrave    Nutrition and exercise discussed in detail     Will check coverage for wegovy   See associated dx codes   Metabolic syndrome   Insulin resistance  Abdominal obesity   Depression   OA   dyslipidideima   htn   BMI >35    We can also include in previously tried medications contrave   He has been complying with nutrition and exercise plan for well over a year     He has also tried mail order stimulant/sympathomametic in the past   He has tried meal replacement in the past     30+ min spent                  Balance problems     We checked labs   He is open to neurology consultation   Denies vertigo   Denies orthostatic symptoms            Relevant Orders    Referral to Neurology     Other Visit Diagnoses       Cervical spondylosis        Relevant Orders    Referral to Pain Clinic    Family history of dementia        Relevant Orders    Referral to Neurology    Metabolic syndrome        Relevant Medications    Semaglutide (WEGOVY) 0.25 MG/0.5ML Solution Auto-injector Pen-injector    Abdominal obesity        Relevant Medications    Semaglutide (WEGOVY) 0.25 MG/0.5ML Solution Auto-injector Pen-injector    Insulin resistance        Relevant Medications    Semaglutide (WEGOVY) 0.25 MG/0.5ML Solution Auto-injector Pen-injector    Dyslipidemia        Relevant Medications    Semaglutide (WEGOVY) 0.25 MG/0.5ML Solution Auto-injector Pen-injector    Osteoarthritis, unspecified osteoarthritis type, unspecified site        Relevant Medications    Semaglutide (WEGOVY) 0.25 MG/0.5ML Solution Auto-injector Pen-injector    Depression, unspecified depression type        Relevant Medications    Semaglutide (WEGOVY) 0.25 MG/0.5ML  Solution Auto-injector Pen-injector                  Instructed to follow up if symptoms worsen or fail to improve, ER/UC precautions discussed as well    Kaylene Nova MD  [facility], [address]   [place] [address]  Phone: [phone]

## 2023-04-13 NOTE — TELEPHONE ENCOUNTER
DOCUMENTATION OF PAR STATUS:    1. Name of Medication & Dose: Wegovy     2. Name of Prescription Coverage Company & phone #: [employer]    3. Date Prior Auth Submitted: 4/13/23    4. What information was given to obtain insurance decision? [employer] via cover my meds    5. Prior Auth Status? Pending    6. Patient Notified: no

## 2023-04-14 LAB — VIT B1 BLD-MCNC: 172 NMOL/L (ref 70–180)

## 2023-05-05 ENCOUNTER — TELEPHONE (OUTPATIENT)
Dept: HEALTH INFORMATION MANAGEMENT | Facility: OTHER | Age: 63
End: 2023-05-05
Payer: COMMERCIAL

## 2023-07-06 DIAGNOSIS — Z71.3 ENCOUNTER FOR WEIGHT LOSS COUNSELING: ICD-10-CM

## 2023-07-06 NOTE — TELEPHONE ENCOUNTER
Was the patient seen in the last year in this department? Yes   Does patient have an active prescription for medications requested? No   Received Request Via: Pharmacy  Pharmacy requested a 120 tab  for contrave rx due to savings cost to pt.

## 2023-07-07 RX ORDER — NALTREXONE HYDROCHLORIDE AND BUPROPION HYDROCHLORIDE 8; 90 MG/1; MG/1
TABLET, EXTENDED RELEASE ORAL
Qty: 120 TABLET | Refills: 1 | Status: SHIPPED | OUTPATIENT
Start: 2023-07-07 | End: 2023-10-24 | Stop reason: SDUPTHER

## 2023-07-20 ENCOUNTER — OFFICE VISIT (OUTPATIENT)
Dept: MEDICAL GROUP | Facility: MEDICAL CENTER | Age: 63
End: 2023-07-20
Payer: COMMERCIAL

## 2023-07-20 VITALS
TEMPERATURE: 98.1 F | WEIGHT: 273.37 LBS | RESPIRATION RATE: 20 BRPM | BODY MASS INDEX: 37.03 KG/M2 | HEIGHT: 72 IN | OXYGEN SATURATION: 94 % | SYSTOLIC BLOOD PRESSURE: 124 MMHG | HEART RATE: 60 BPM | DIASTOLIC BLOOD PRESSURE: 82 MMHG

## 2023-07-20 DIAGNOSIS — G89.29 CHRONIC PAIN OF BOTH KNEES: ICD-10-CM

## 2023-07-20 DIAGNOSIS — M25.561 CHRONIC PAIN OF BOTH KNEES: ICD-10-CM

## 2023-07-20 DIAGNOSIS — M25.562 CHRONIC PAIN OF BOTH KNEES: ICD-10-CM

## 2023-07-20 DIAGNOSIS — E66.9 OBESITY (BMI 35.0-39.9 WITHOUT COMORBIDITY): ICD-10-CM

## 2023-07-20 PROCEDURE — 3079F DIAST BP 80-89 MM HG: CPT | Performed by: FAMILY MEDICINE

## 2023-07-20 PROCEDURE — 99214 OFFICE O/P EST MOD 30 MIN: CPT | Performed by: FAMILY MEDICINE

## 2023-07-20 PROCEDURE — 3074F SYST BP LT 130 MM HG: CPT | Performed by: FAMILY MEDICINE

## 2023-07-20 NOTE — ASSESSMENT & PLAN NOTE
Some regain   We are going to correct the underlying problem which is his knee pain   Pause on meds for now     Follow up 6 mo or sooner to resume     30+ min spent

## 2023-07-20 NOTE — PROGRESS NOTES
This medical record contains text that has been entered with the assistance of computer voice recognition and dictation software.  Therefore, it may contain unintended errors in text, spelling, punctuation, or grammar        Chief Complaint   Patient presents with    Hypertension    Tired     Feels like his energy level has decreased off/on x 1mo  Except if stays busy.     Knee Pain     Bilat knee pain x 3 years off/on; believes it could be a muscle related problem  Was seen at [facility]; DX: Some arthritis    Results       CLAIR VALENCIA JR is a 62 y.o. male here evaluation and management of:           Current Outpatient Medications   Medication Sig Dispense Refill    Naltrexone-buPROPion HCl ER (CONTRAVE) 8-90 MG TABLET SR 12 HR 1 TAB BY MOUTH DAILY FOR 1 WEEK, TAKE 1 TAB TWICE A DAY X 1 WEEK, THEN TAKE 2 TABS EVERY MORNING AND 1 TAB EVERY EVENING AND TWO TABS TWICE A DAY INDEFINITELY 120 Tablet 1    buPROPion (WELLBUTRIN XL) 150 MG XL tablet TAKE 1 TAB BY MOUTH EVERY MORNING. LOWER DOSE OF WELLBUTRIN FROM 300XL DAILY TO 150XL DAILY WHEN STARTING CONTRAVE 90 Tablet 3    escitalopram (LEXAPRO) 20 MG tablet TAKE 1 TABLET BY MOUTH EVERY DAY 90 Tablet 3    propranolol (INDERAL) 40 MG Tab TAKE 1 TABLET BY MOUTH TWICE A  Tablet 3    lisinopril (PRINIVIL) 10 MG Tab TAKE 1 TABLET BY MOUTH EVERY DAY 90 Tablet 4    sildenafil citrate (VIAGRA) 100 MG tablet 1/4-1 tab po approx 15 min prior to as needed 10 tablet 11    vitamin D, Ergocalciferol, (DRISDOL) 19347 units Cap capsule Take 50,000 Units by mouth every 7 days.  2     No current facility-administered medications for this visit.     Patient Active Problem List    Diagnosis Date Noted    Chronic pain of both knees 07/20/2023    Balance problems 01/10/2023    Nausea 01/10/2023    Frequent headaches 12/19/2022    Encounter for weight loss counseling 09/15/2021    Vaccine counseling 09/15/2021    Arthritis of right knee 08/02/2021    Arthritis of left knee 08/02/2021     Preventative health care 07/01/2021    Erectile dysfunction 11/24/2020    Fatigue 06/29/2018    Colon cancer screening 04/04/2018    Vitamin D deficiency 10/09/2017    Obesity (BMI 35.0-39.9 without comorbidity) (HCC) 10/09/2017    Vasomotor rhinitis 11/04/2016    Hyperlipidemia 10/10/2014    Severe anxiety with panic 01/13/2014    Hypertension      Past Surgical History:   Procedure Laterality Date    TONSILLECTOMY        Social History     Tobacco Use    Smoking status: Never    Smokeless tobacco: Never   Vaping Use    Vaping Use: Never used   Substance Use Topics    Alcohol use: Yes     Alcohol/week: 1.0 oz     Types: 1 Glasses of wine, 1 Cans of beer per week     Comment: occasionally    Drug use: No     Family History   Problem Relation Age of Onset    Heart Disease Father            ROS    all review of system completed and negative except for those listed above     Objective:     /82 (BP Location: Left arm, Patient Position: Sitting, BP Cuff Size: Large adult)   Pulse 60   Temp 36.7 °C (98.1 °F) (Temporal)   Resp 20   Ht 1.829 m (6')   Wt 124 kg (273 lb 5.9 oz)   SpO2 94%  Body mass index is 37.08 kg/m².  Physical Exam:    Constitutional: Alert, no distress.  Skin: Warm, dry, good turgor, no rashes in visible areas.  Eye: Equal, round and reactive, conjunctiva clear, lids normal.  ENMT: Lips without lesions, good dentition, oropharynx clear.  Neck: Trachea midline, no masses, no thyromegaly. No cervical or supraclavicular lymphadenopathy.  Respiratory: Unlabored respiratory effort, lungs clear to auscultation, no wheezes, no ronchi.  Cardiovascular: Normal S1, S2, no murmur, no edema.  Abdomen: Soft, non-tender, no masses, no hepatosplenomegaly.  Psych: Alert and oriented x3, normal affect and mood.          Assessment and Plan:   The following treatment plan was discussed        Problem List Items Addressed This Visit       Obesity (BMI 35.0-39.9 without comorbidity) ([facility])     Some regain   We  are going to correct the underlying problem which is his knee pain   Pause on meds for now     Follow up 6 mo or sooner to resume     30+ min spent          Chronic pain of both knees     Anterior   Vague   R worse than left   Unstable ground and down hill seems to aggravate him     I suspect pfps   Pathophysiology discussed   Exercises provided and disucssed   Sleeve encouraged   Plan for MSK consult     30+ min spent            Relevant Orders    Referral to Sports Medicine    DX-KNEES-AP BILATERAL STANDING             Instructed to follow up if symptoms worsen or fail to improve, ER/UC precautions discussed as well    Kaylene Nova MD  [facility], Family Medicine   [address] [place] [address] [address]   [place] [address]  Phone: [phone]

## 2023-07-20 NOTE — ASSESSMENT & PLAN NOTE
Anterior   Vague   R worse than left   Unstable ground and down hill seems to aggravate him     I suspect pfps   Pathophysiology discussed   Exercises provided and disucssed   Sleeve encouraged   Plan for MSK consult     30+ min spent

## 2023-07-26 DIAGNOSIS — F41.0 SEVERE ANXIETY WITH PANIC: ICD-10-CM

## 2023-07-26 NOTE — TELEPHONE ENCOUNTER
Received request via: Pharmacy    Was the patient seen in the last year in this department? Yes    Does the patient have an active prescription (recently filled or refills available) for medication(s) requested? No    Does the patient have care home Plus and need 100 day supply (blood pressure, diabetes and cholesterol meds only)? Patient does not have SCP    Future Appointments         Provider Department Center    2/23/2024 10:20 AM (Arrive by 10:05 AM) Kaylene Nova M.D. [facility]

## 2023-07-27 RX ORDER — ESCITALOPRAM OXALATE 20 MG/1
20 TABLET ORAL
Qty: 90 TABLET | Refills: 1 | Status: SHIPPED | OUTPATIENT
Start: 2023-07-27 | End: 2023-12-22 | Stop reason: SDUPTHER

## 2023-08-01 ENCOUNTER — OFFICE VISIT (OUTPATIENT)
Dept: SPORTS MEDICINE | Facility: CLINIC | Age: 63
End: 2023-08-01
Attending: FAMILY MEDICINE
Payer: COMMERCIAL

## 2023-08-01 VITALS
BODY MASS INDEX: 37.03 KG/M2 | OXYGEN SATURATION: 97 % | RESPIRATION RATE: 16 BRPM | DIASTOLIC BLOOD PRESSURE: 76 MMHG | HEART RATE: 67 BPM | HEIGHT: 72 IN | SYSTOLIC BLOOD PRESSURE: 122 MMHG | TEMPERATURE: 98.5 F | WEIGHT: 273.37 LBS

## 2023-08-01 DIAGNOSIS — M22.2X1 PATELLOFEMORAL ARTHRALGIA OF BOTH KNEES: ICD-10-CM

## 2023-08-01 DIAGNOSIS — M22.2X2 PATELLOFEMORAL ARTHRALGIA OF BOTH KNEES: ICD-10-CM

## 2023-08-01 DIAGNOSIS — M17.0 LOCALIZED OSTEOARTHRITIS OF KNEES, BILATERAL: ICD-10-CM

## 2023-08-01 PROCEDURE — 99214 OFFICE O/P EST MOD 30 MIN: CPT | Performed by: FAMILY MEDICINE

## 2023-08-01 PROCEDURE — 3074F SYST BP LT 130 MM HG: CPT | Performed by: FAMILY MEDICINE

## 2023-08-01 PROCEDURE — 3078F DIAST BP <80 MM HG: CPT | Performed by: FAMILY MEDICINE

## 2023-08-01 SDOH — HEALTH STABILITY: PHYSICAL HEALTH: ON AVERAGE, HOW MANY MINUTES DO YOU ENGAGE IN EXERCISE AT THIS LEVEL?: 20 MIN

## 2023-08-01 SDOH — ECONOMIC STABILITY: INCOME INSECURITY: IN THE LAST 12 MONTHS, WAS THERE A TIME WHEN YOU WERE NOT ABLE TO PAY THE MORTGAGE OR RENT ON TIME?: NO

## 2023-08-01 SDOH — ECONOMIC STABILITY: HOUSING INSECURITY
IN THE LAST 12 MONTHS, WAS THERE A TIME WHEN YOU DID NOT HAVE A STEADY PLACE TO SLEEP OR SLEPT IN A SHELTER (INCLUDING NOW)?: NO

## 2023-08-01 SDOH — HEALTH STABILITY: PHYSICAL HEALTH: ON AVERAGE, HOW MANY DAYS PER WEEK DO YOU ENGAGE IN MODERATE TO STRENUOUS EXERCISE (LIKE A BRISK WALK)?: 1 DAY

## 2023-08-01 SDOH — ECONOMIC STABILITY: TRANSPORTATION INSECURITY
IN THE PAST 12 MONTHS, HAS THE LACK OF TRANSPORTATION KEPT YOU FROM MEDICAL APPOINTMENTS OR FROM GETTING MEDICATIONS?: NO

## 2023-08-01 SDOH — ECONOMIC STABILITY: TRANSPORTATION INSECURITY
IN THE PAST 12 MONTHS, HAS LACK OF TRANSPORTATION KEPT YOU FROM MEETINGS, WORK, OR FROM GETTING THINGS NEEDED FOR DAILY LIVING?: NO

## 2023-08-01 SDOH — ECONOMIC STABILITY: HOUSING INSECURITY: IN THE LAST 12 MONTHS, HOW MANY PLACES HAVE YOU LIVED?: 1

## 2023-08-01 SDOH — ECONOMIC STABILITY: TRANSPORTATION INSECURITY
IN THE PAST 12 MONTHS, HAS LACK OF RELIABLE TRANSPORTATION KEPT YOU FROM MEDICAL APPOINTMENTS, MEETINGS, WORK OR FROM GETTING THINGS NEEDED FOR DAILY LIVING?: NO

## 2023-08-01 SDOH — ECONOMIC STABILITY: FOOD INSECURITY: WITHIN THE PAST 12 MONTHS, YOU WORRIED THAT YOUR FOOD WOULD RUN OUT BEFORE YOU GOT MONEY TO BUY MORE.: NEVER TRUE

## 2023-08-01 SDOH — HEALTH STABILITY: MENTAL HEALTH
STRESS IS WHEN SOMEONE FEELS TENSE, NERVOUS, ANXIOUS, OR CAN'T SLEEP AT NIGHT BECAUSE THEIR MIND IS TROUBLED. HOW STRESSED ARE YOU?: NOT AT ALL

## 2023-08-01 SDOH — ECONOMIC STABILITY: FOOD INSECURITY: WITHIN THE PAST 12 MONTHS, THE FOOD YOU BOUGHT JUST DIDN'T LAST AND YOU DIDN'T HAVE MONEY TO GET MORE.: NEVER TRUE

## 2023-08-01 SDOH — ECONOMIC STABILITY: INCOME INSECURITY: HOW HARD IS IT FOR YOU TO PAY FOR THE VERY BASICS LIKE FOOD, HOUSING, MEDICAL CARE, AND HEATING?: NOT HARD AT ALL

## 2023-08-01 ASSESSMENT — SOCIAL DETERMINANTS OF HEALTH (SDOH)
HOW OFTEN DO YOU HAVE SIX OR MORE DRINKS ON ONE OCCASION: NEVER
HOW HARD IS IT FOR YOU TO PAY FOR THE VERY BASICS LIKE FOOD, HOUSING, MEDICAL CARE, AND HEATING?: NOT HARD AT ALL
HOW MANY DRINKS CONTAINING ALCOHOL DO YOU HAVE ON A TYPICAL DAY WHEN YOU ARE DRINKING: PATIENT DECLINED
WITHIN THE PAST 12 MONTHS, YOU WORRIED THAT YOUR FOOD WOULD RUN OUT BEFORE YOU GOT THE MONEY TO BUY MORE: NEVER TRUE
HOW OFTEN DO YOU HAVE A DRINK CONTAINING ALCOHOL: MONTHLY OR LESS

## 2023-08-01 ASSESSMENT — LIFESTYLE VARIABLES
HOW OFTEN DO YOU HAVE SIX OR MORE DRINKS ON ONE OCCASION: NEVER
HOW OFTEN DO YOU HAVE A DRINK CONTAINING ALCOHOL: MONTHLY OR LESS
SKIP TO QUESTIONS 9-10: 0
HOW MANY STANDARD DRINKS CONTAINING ALCOHOL DO YOU HAVE ON A TYPICAL DAY: PATIENT DECLINED
AUDIT-C TOTAL SCORE: -1

## 2023-08-01 NOTE — Clinical Note
Sherman Maurice, Thank you for referring Jose to the sports medicine clinic. I think he had an osteoarthritis flare which has resolved.  He is better. I encouraged him to continue his home exercise program. He will follow-up on an as-needed basis at this point. Hope you are well! Respectfully,  RAUL Landrum M.D. [facility] [phone]

## 2023-08-01 NOTE — PROGRESS NOTES
Chief Complaint   Patient presents with    Knee Pain     Bilateral knee pain      CHIEF COMPLAINT:  CLAIR VALENCIA JR male presenting at the request of Kaylene Nova MD for evaluation of knee pain.     CLAIR VALENCIA JR is complaining of bilateral knee pain  present for 3 years ago LEFT knee pain episode  Last summer had pain in thr RIGHT knee, similar episode  Pain is at the anterior knee  Quality is aching, sharp, pressure  Pain is non-radiating   Improved with movement  Aggravated by lying down  no prior problems with this area in the past   Prior Treatments:  seen by ortho (Dr. Jama at [facility])  Prior studies: X-Ray back in 2021  Medications tried for pain include: ibuprofen (OTC), acetaminophen helps some  Mechanical Symptom history: Popping/clicking which is not necessarily painful    Retired  Activities include camping, boating and working on all vehicles/restoration    REVIEW OF SYSTEMS  No Nausea, No Vomiting, No Chest Pain, No Shortness of Breath, No Dizziness, No Headache    PAST MEDICAL HISTORY:   History reviewed. No pertinent past medical history.    PMH:  has a past medical history of Chest pain at rest (1/13/2014), Cough (3/1/2016), Depression, HTN, Hyperlipidemia (10/10/2014), Hypertension, and Situational mixed anxiety and depressive disorder (1/13/2014).  MEDS:   Current Outpatient Medications:     escitalopram (LEXAPRO) 20 MG tablet, TAKE 1 TABLET BY MOUTH EVERY DAY, Disp: 90 Tablet, Rfl: 1    Naltrexone-buPROPion HCl ER (CONTRAVE) 8-90 MG TABLET SR 12 HR, 1 TAB BY MOUTH DAILY FOR 1 WEEK, TAKE 1 TAB TWICE A DAY X 1 WEEK, THEN TAKE 2 TABS EVERY MORNING AND 1 TAB EVERY EVENING AND TWO TABS TWICE A DAY INDEFINITELY, Disp: 120 Tablet, Rfl: 1    buPROPion (WELLBUTRIN XL) 150 MG XL tablet, TAKE 1 TAB BY MOUTH EVERY MORNING. LOWER DOSE OF WELLBUTRIN FROM 300XL DAILY TO 150XL DAILY WHEN STARTING CONTRAVE, Disp: 90 Tablet, Rfl: 3    propranolol (INDERAL) 40 MG Tab, TAKE 1 TABLET BY MOUTH TWICE A DAY,  Disp: 180 Tablet, Rfl: 3    lisinopril (PRINIVIL) 10 MG Tab, TAKE 1 TABLET BY MOUTH EVERY DAY, Disp: 90 Tablet, Rfl: 4    sildenafil citrate (VIAGRA) 100 MG tablet, 1/4-1 tab po approx 15 min prior to as needed, Disp: 10 tablet, Rfl: 11    vitamin D, Ergocalciferol, (DRISDOL) 79941 units Cap capsule, Take 50,000 Units by mouth every 7 days., Disp: , Rfl: 2  ALLERGIES:   Allergies   Allergen Reactions    Pcn [Penicillins]      Pt unsure if he has allergy but dad had anaphylaxis     SURGHX:   Past Surgical History:   Procedure Laterality Date    TONSILLECTOMY       SOCHX:  reports that he has never smoked. He has never used smokeless tobacco. He reports current alcohol use of about 1.0 oz of alcohol per week. He reports that he does not use drugs.  FH: Family history was reviewed, no pertinent findings to report     PHYSICAL EXAM:  /76 (BP Location: Left arm, Patient Position: Sitting, BP Cuff Size: Large adult)   Pulse 67   Temp 36.9 °C (98.5 °F) (Temporal)   Resp 16   Ht 1.829 m (6')   Wt 124 kg (273 lb 5.9 oz)   SpO2 97%   BMI 37.08 kg/m²      obese in no apparent distress, alert and oriented x 3.  Gait: Normal      RIGHT Knee:  Slight Varus and No Swelling  Range of Motion Intact  Trace effusion  Patellar No tenderness and no apprehension  Medial Joint Line Non-tender and NEGATIVE Dann  Lateral Joint Line Non-tender and NEGATIVE Dann  Trace Laxity with Varus stress  Trace Laxity with Valgus stress  Lachman's testing is Trace  Posterior Drawer Testing is Trace  The leg is otherwise neurovascularly intact    LEFT Knee:  Slight Varus and No Swelling   Range of Motion Intact  Trace effusion  Patellar No tenderness and no apprehension  Medial Joint Line Non-tender and NEGATIVE Dann  Lateral Joint Line Non-tender and NEGATIVE Dann  Trace Laxity with Varus stress  Trace Laxity with Valgus stress  Lachman's testing is Trace  Posterior Drawer Testing is Trace  The leg is otherwise  neurovascularly intact    Additional Findings: Tight hamstrings    1. Localized osteoarthritis of knees, bilateral        2. Patellofemoral arthralgia of both knees          present for 3 years ago LEFT knee pain episode  Last summer had pain in thr RIGHT knee, similar episode  Pain is at the anterior knee  Quality is aching, sharp, pressure    Discussed knee osteoarthritis management options includin.  Joint protection  2.  Non-offending activities such as cycling or swimming   3.  Knee bracing  4. Injection options including corticosteroid    Patient would like to proceed with home exercise program  Since he is feeling better, he would like to hold off on injection or therapy at this point    Follow-up as needed, we can consider intra-articular corticosteroid injection in either knee if symptoms recur        Angel Jama M.D.  [phone] 2021 Routine     Narrative  Standing 4 views of both knees shows got significant narrowing in both   medial compartments.  Is not quite down to bone-on-bone but is got some   early subchondral sclerosis and peripheral osteophytes.  His patellas are   both tracking centrally some mild hypertrophic changes there.           Exam Ended: 21  8:14 AM Last Resulted: 21  8:39 AM              Interpreted in the office today with the patient    Thank you Kaylene Nova MD for allowing me to participate in caring for your patient.

## 2023-08-14 ENCOUNTER — TELEPHONE (OUTPATIENT)
Dept: HEALTH INFORMATION MANAGEMENT | Facility: OTHER | Age: 63
End: 2023-08-14
Payer: COMMERCIAL

## 2023-09-01 DIAGNOSIS — I10 ESSENTIAL HYPERTENSION: ICD-10-CM

## 2023-09-01 NOTE — TELEPHONE ENCOUNTER
Received request via: Pharmacy    Was the patient seen in the last year in this department? Yes    Does the patient have an active prescription (recently filled or refills available) for medication(s) requested? No    Does the patient have intermediate Plus and need 100 day supply (blood pressure, diabetes and cholesterol meds only)? Patient does not have SCP    Future Appointments         Provider Department Center    2/23/2024 10:20 AM (Arrive by 10:05 AM) Kaylene Nova M.D. [facility]

## 2023-09-05 RX ORDER — LISINOPRIL 10 MG/1
10 TABLET ORAL
Qty: 90 TABLET | Refills: 1 | Status: SHIPPED | OUTPATIENT
Start: 2023-09-05 | End: 2024-03-05

## 2023-10-12 DIAGNOSIS — I10 ESSENTIAL HYPERTENSION: ICD-10-CM

## 2023-10-12 RX ORDER — PROPRANOLOL HYDROCHLORIDE 40 MG/1
TABLET ORAL
Qty: 180 TABLET | Refills: 3 | Status: SHIPPED | OUTPATIENT
Start: 2023-10-12

## 2023-10-24 ENCOUNTER — OFFICE VISIT (OUTPATIENT)
Dept: MEDICAL GROUP | Facility: MEDICAL CENTER | Age: 63
End: 2023-10-24
Payer: COMMERCIAL

## 2023-10-24 VITALS
OXYGEN SATURATION: 95 % | HEART RATE: 60 BPM | TEMPERATURE: 99 F | HEIGHT: 72 IN | RESPIRATION RATE: 20 BRPM | DIASTOLIC BLOOD PRESSURE: 86 MMHG | WEIGHT: 263.78 LBS | BODY MASS INDEX: 35.73 KG/M2 | SYSTOLIC BLOOD PRESSURE: 130 MMHG

## 2023-10-24 DIAGNOSIS — I10 PRIMARY HYPERTENSION: ICD-10-CM

## 2023-10-24 DIAGNOSIS — R26.89 BALANCE PROBLEMS: ICD-10-CM

## 2023-10-24 DIAGNOSIS — M54.12 CERVICAL RADICULOPATHY: ICD-10-CM

## 2023-10-24 DIAGNOSIS — Z71.3 ENCOUNTER FOR WEIGHT LOSS COUNSELING: ICD-10-CM

## 2023-10-24 DIAGNOSIS — Z23 NEED FOR VACCINATION: ICD-10-CM

## 2023-10-24 PROCEDURE — 90471 IMMUNIZATION ADMIN: CPT | Performed by: FAMILY MEDICINE

## 2023-10-24 PROCEDURE — 3079F DIAST BP 80-89 MM HG: CPT | Performed by: FAMILY MEDICINE

## 2023-10-24 PROCEDURE — 3075F SYST BP GE 130 - 139MM HG: CPT | Performed by: FAMILY MEDICINE

## 2023-10-24 PROCEDURE — 90686 IIV4 VACC NO PRSV 0.5 ML IM: CPT | Performed by: FAMILY MEDICINE

## 2023-10-24 PROCEDURE — 99214 OFFICE O/P EST MOD 30 MIN: CPT | Mod: 25 | Performed by: FAMILY MEDICINE

## 2023-10-24 RX ORDER — NALTREXONE HYDROCHLORIDE AND BUPROPION HYDROCHLORIDE 8; 90 MG/1; MG/1
2 TABLET, EXTENDED RELEASE ORAL 2 TIMES DAILY
Qty: 120 TABLET | Refills: 1 | Status: SHIPPED | OUTPATIENT
Start: 2023-10-24 | End: 2023-12-22

## 2023-10-24 RX ORDER — BUPROPION HYDROCHLORIDE 150 MG/1
TABLET ORAL
Qty: 90 TABLET | Refills: 3 | Status: SHIPPED | OUTPATIENT
Start: 2023-10-24 | End: 2023-12-22

## 2023-10-24 NOTE — PROGRESS NOTES
This medical record contains text that has been entered with the assistance of computer voice recognition and dictation software.  Therefore, it may contain unintended errors in text, spelling, punctuation, or grammar        Chief Complaint   Patient presents with    Weight Loss     RX Contrave and other medications    Neck Pain     Neck hurts and gets a headache when turns his head x 1.5 mo ago.   Pain got better as well as the headache after seeing the chiropractor, changing the pillow but now it's gotten worse x 1 wk ago.  Every time pt sees the chiropractor pain improves but returns.         CLAIR VALENCIA JR is a 63 y.o. male here evaluation and management of:         Current Outpatient Medications   Medication Sig Dispense Refill    buPROPion (WELLBUTRIN XL) 150 MG XL tablet TAKE 1 TAB BY MOUTH EVERY MORNING. LOWER DOSE OF WELLBUTRIN FROM 300XL DAILY TO 150XL DAILY WHEN STARTING CONTRAVE 90 Tablet 3    Naltrexone-buPROPion HCl ER (CONTRAVE) 8-90 MG TABLET SR 12 HR Take 2 Tablets by mouth 2 times a day. 120 Tablet 1    propranolol (INDERAL) 40 MG Tab TAKE 1 TABLET BY MOUTH TWICE A  Tablet 3    lisinopril (PRINIVIL) 10 MG Tab TAKE 1 TABLET BY MOUTH EVERY DAY 90 Tablet 1    escitalopram (LEXAPRO) 20 MG tablet TAKE 1 TABLET BY MOUTH EVERY DAY 90 Tablet 1    sildenafil citrate (VIAGRA) 100 MG tablet 1/4-1 tab po approx 15 min prior to as needed 10 tablet 11    vitamin D, Ergocalciferol, (DRISDOL) 21897 units Cap capsule Take 50,000 Units by mouth every 7 days.  2     No current facility-administered medications for this visit.     Patient Active Problem List    Diagnosis Date Noted    Cervical radiculopathy 10/24/2023    Chronic pain of both knees 07/20/2023    Balance problems 01/10/2023    Nausea 01/10/2023    Frequent headaches 12/19/2022    Encounter for weight loss counseling 09/15/2021    Vaccine counseling 09/15/2021    Arthritis of right knee 08/02/2021    Arthritis of left knee 08/02/2021     Preventative health care 07/01/2021    Erectile dysfunction 11/24/2020    Fatigue 06/29/2018    Colon cancer screening 04/04/2018    Vitamin D deficiency 10/09/2017    Obesity (BMI 35.0-39.9 without comorbidity) (HCC) 10/09/2017    Vasomotor rhinitis 11/04/2016    Hyperlipidemia 10/10/2014    Severe anxiety with panic 01/13/2014    Hypertension      Past Surgical History:   Procedure Laterality Date    TONSILLECTOMY        Social History     Tobacco Use    Smoking status: Never    Smokeless tobacco: Never   Vaping Use    Vaping Use: Never used   Substance Use Topics    Alcohol use: Yes     Alcohol/week: 1.0 oz     Types: 1 Glasses of wine, 1 Cans of beer per week     Comment: occasionally    Drug use: No     Family History   Problem Relation Age of Onset    Heart Disease Father            ROS    all review of system completed and negative except for those listed above     Objective:     /86 (BP Location: Left arm, Patient Position: Sitting, BP Cuff Size: Large adult)   Pulse 60   Temp 37.2 °C (99 °F) (Temporal)   Resp 20   Ht 1.829 m (6')   Wt 120 kg (263 lb 12.5 oz)   SpO2 95%  Body mass index is 35.78 kg/m².  Physical Exam:        GEN: comfortable, alert and oriented, well nourished, well developed, in no apparent distress   HEENT: NCAT, eyes: pupils equal and reactive, sclera white, EOMIT, good dentition  HEART: limbs warm and well perfused, regular rate, no JVD, no lower extremity edema  LUNGS: speaking in full sentences, not in apparent respiratory distress, no audible wheezes  MSK: normal tone and bulk, no swelling of the joints, gait steady and normal       Assessment and Plan:   The following treatment plan was discussed        Problem List Items Addressed This Visit       Hypertension     Moderate control   Continue q6 mo visits            Encounter for weight loss counseling     Would like to continue contrave   He did have initial success but has seen regain   We are seeing weight loss again      He sights that his knees /knee pain were a bit deterrent from behavioral modification/exercise/healthy eating     Nutrition and exercise discussed in detail     Comorbidity  -   Metabolic syndrome   Insulin resistance  Abdominal obesity   Depression   OA   dyslipidideima   htn   BMI >35    He has also tried mail order stimulant/sympathomametic in the past with meal replacement   He has tried meal replacement in the past     Follow up 6 mo    30+ min spent                  Relevant Medications    buPROPion (WELLBUTRIN XL) 150 MG XL tablet    Naltrexone-buPROPion HCl ER (CONTRAVE) 8-90 MG TABLET SR 12 HR    Balance problems     Describes BPPV symptoms   Pathophysiology discussed   epply maneuver suggested          Cervical radiculopathy     Again I recommend physiatry consutlation   He has off and on neck pain with cervicogenic headache   He see's chiropracter   But he would eventually benefit from physiatry to correct the underlying condition          Relevant Medications    buPROPion (WELLBUTRIN XL) 150 MG XL tablet    Naltrexone-buPROPion HCl ER (CONTRAVE) 8-90 MG TABLET SR 12 HR     Other Visit Diagnoses       Need for vaccination        Relevant Orders    INFLUENZA VACCINE QUAD INJ (PF) (Completed)                  Instructed to follow up if symptoms worsen or fail to improve, ER/UC precautions discussed as well    Kaylene Nova MD  [facility], Family Medicine   [address] [place] [address] [address]   [place] [address]  Phone: [phone]

## 2023-10-24 NOTE — ASSESSMENT & PLAN NOTE
Again I recommend physiatry consutlation   He has off and on neck pain with cervicogenic headache   He see's chiropracter   But he would eventually benefit from physiatry to correct the underlying condition

## 2023-10-24 NOTE — ASSESSMENT & PLAN NOTE
Would like to continue contrave   He did have initial success but has seen regain   We are seeing weight loss again     He sights that his knees /knee pain were a bit deterrent from behavioral modification/exercise/healthy eating     Nutrition and exercise discussed in detail     Comorbidity  -   Metabolic syndrome   Insulin resistance  Abdominal obesity   Depression   OA   dyslipidideima   htn   BMI >35    He has also tried mail order stimulant/sympathomametic in the past with meal replacement   He has tried meal replacement in the past     Follow up 6 mo    30+ min spent

## 2023-11-13 ENCOUNTER — TELEPHONE (OUTPATIENT)
Dept: MEDICAL GROUP | Facility: MEDICAL CENTER | Age: 63
End: 2023-11-13
Payer: COMMERCIAL

## 2023-11-13 NOTE — TELEPHONE ENCOUNTER
Phone Number Called: [phone] (home) [phone] (work)    Call outcome:  Pt left a mess to report that he ran out of his rx wellbutrin, pt states he'd been taking it x years and now has not been taking it for some time but feels great without it and no symptoms. Pt wondered if he should discontinue this rx? I informed pt that Dr. Nova is out on maternity leave, I'd be taking a note however if he experiences any symptoms he needs to see one of our providers in the meantime. Pt verbalized understanding.

## 2023-11-13 NOTE — TELEPHONE ENCOUNTER
If patient has not been taking Wellbutrin and is doing well, he does not need to continue to take it. He should continue to follow up with PCP as directed.   Korina Castro M.D.

## 2023-12-22 ENCOUNTER — OFFICE VISIT (OUTPATIENT)
Dept: MEDICAL GROUP | Facility: MEDICAL CENTER | Age: 63
End: 2023-12-22
Payer: COMMERCIAL

## 2023-12-22 VITALS
DIASTOLIC BLOOD PRESSURE: 72 MMHG | HEART RATE: 65 BPM | SYSTOLIC BLOOD PRESSURE: 126 MMHG | WEIGHT: 269.84 LBS | OXYGEN SATURATION: 97 % | HEIGHT: 72 IN | BODY MASS INDEX: 36.55 KG/M2 | TEMPERATURE: 97.2 F

## 2023-12-22 DIAGNOSIS — K52.9 CHRONIC DIARRHEA: ICD-10-CM

## 2023-12-22 DIAGNOSIS — Z71.3 ENCOUNTER FOR WEIGHT LOSS COUNSELING: ICD-10-CM

## 2023-12-22 DIAGNOSIS — E55.9 VITAMIN D DEFICIENCY: ICD-10-CM

## 2023-12-22 DIAGNOSIS — Z11.4 ENCOUNTER FOR SCREENING FOR HIV: ICD-10-CM

## 2023-12-22 DIAGNOSIS — F41.0 SEVERE ANXIETY WITH PANIC: ICD-10-CM

## 2023-12-22 DIAGNOSIS — R22.1 NECK ENLARGEMENT: ICD-10-CM

## 2023-12-22 DIAGNOSIS — E66.9 OBESITY (BMI 35.0-39.9 WITHOUT COMORBIDITY): ICD-10-CM

## 2023-12-22 PROBLEM — R11.0 NAUSEA: Status: RESOLVED | Noted: 2023-01-10 | Resolved: 2023-12-22

## 2023-12-22 PROBLEM — Z71.85 VACCINE COUNSELING: Status: RESOLVED | Noted: 2021-09-15 | Resolved: 2023-12-22

## 2023-12-22 PROBLEM — R53.83 FATIGUE: Status: RESOLVED | Noted: 2018-06-29 | Resolved: 2023-12-22

## 2023-12-22 PROBLEM — Z12.11 COLON CANCER SCREENING: Status: RESOLVED | Noted: 2018-04-04 | Resolved: 2023-12-22

## 2023-12-22 PROBLEM — R26.89 BALANCE PROBLEMS: Status: RESOLVED | Noted: 2023-01-10 | Resolved: 2023-12-22

## 2023-12-22 PROCEDURE — 3074F SYST BP LT 130 MM HG: CPT | Performed by: FAMILY MEDICINE

## 2023-12-22 PROCEDURE — 3078F DIAST BP <80 MM HG: CPT | Performed by: FAMILY MEDICINE

## 2023-12-22 PROCEDURE — 99214 OFFICE O/P EST MOD 30 MIN: CPT | Performed by: FAMILY MEDICINE

## 2023-12-22 RX ORDER — ESCITALOPRAM OXALATE 20 MG/1
20 TABLET ORAL
Qty: 90 TABLET | Refills: 1 | Status: SHIPPED | OUTPATIENT
Start: 2023-12-22

## 2023-12-22 NOTE — PROGRESS NOTES
"Subjective:   CC: questionable neck enlargement    HPI:     CLAIR VALENCIA JR is a 63 y.o. male, established patient of Dr Nova. PCP is out of the office.    Patient states that his daughter, who is a therapist, recently noticed the left-side of neck is slightly enlarged. Patient is asymptomatic except for \"heavy breathing\" intermittently noted by his wife.   Patient also complains of recent development of intermittent explosive diarrhea with fatty foods. Symptoms usually happen immediately after he eats, sometimes lasting for up to 3 weeks. Denies any GI symptoms or unexplained weight loss.   He first noticed symptoms when he was [place] 9 months ago. When he is there, he drank filtered water from [place].   Patient has not had any abdominal surgeries in the past. His GB is still intact.   He has normal colonoscopy 2019  Patient is obese and has tried Contrave in the past for weight loss prescribed by PCP. He was able to lose a few lbs initially with Contrave, however, it has not been working for several months.      Current medicines (including changes today)  Current Outpatient Medications   Medication Sig Dispense Refill    escitalopram (LEXAPRO) 20 MG tablet Take 1 Tablet by mouth every day. 90 Tablet 1    propranolol (INDERAL) 40 MG Tab TAKE 1 TABLET BY MOUTH TWICE A  Tablet 3    lisinopril (PRINIVIL) 10 MG Tab TAKE 1 TABLET BY MOUTH EVERY DAY 90 Tablet 1    vitamin D, Ergocalciferol, (DRISDOL) 73788 units Cap capsule Take 50,000 Units by mouth every 7 days.  2     No current facility-administered medications for this visit.     He  has a past medical history of Chest pain at rest (1/13/2014), Cough (3/1/2016), Depression, HTN, Hyperlipidemia (10/10/2014), Hypertension, and Situational mixed anxiety and depressive disorder (1/13/2014).    I reviewed patient's problem list, allergies, medications, family hx, social hx with patient " and update EPIC.        Objective:     /72 (BP Location: Left arm, Patient Position: Sitting, BP Cuff Size: Large adult)   Pulse 65   Temp 36.2 °C (97.2 °F) (Temporal)   Ht 1.829 m (6')   Wt 122 kg (269 lb 13.5 oz)   SpO2 97%  Body mass index is 36.6 kg/m².    Physical Exam:  Constitutional: awake, alert, in no distress.  Skin: Warm, dry, good turgor, no rashes, bruises, ulcers in visible areas.  Eye: conjunctiva clear, lids neg for edema or lesions.  ENMT: TM and auditory canals wnl. Oral and nasal mucosa wnl. Lips without lesions, good dentition, oropharynx clear.  Neck: Trachea midline, no masses, no thyromegaly. No cervical or supraclavicular lymphadenopathy  Respiratory: Unlabored respiratory effort, lungs clear to auscultation, no wheezes, no rales.  Cardiovascular: Normal S1, S2, no murmur, no pedal edema.  Psych: Oriented x3, affect and mood wnl, intact judgement and insight.       Assessment and Plan:   The following treatment plan was discussed    1. Neck enlargement  Patient concerns of left lateral nec enlargement noticed by his daughter. Patient is asymptomatic. Head/neck exam performed during office visit was unremarkable.   - US-SOFT TISSUES OF HEAD - NECK; Future    2. Chronic diarrhea  - TSH WITH REFLEX TO FT4; Future  - CDIFF BY PCR; Future  - CELIAC DISEASE AB PANEL; Future  - CALPROTECTIN,FECAL; Future  - Complete O&P; Future  - CRYPTO/GIARDIA RAPID ASSAY; Future  - STOOL REDUCING SUBST; Future  - FECAL LACTOFERRIN QUALITATIVE; Future    3. Vitamin D deficiency  Chronic, continue vitamin D 50k weekly. Will order labs for reassessment.   - VITAMIN D,25 HYDROXY (DEFICIENCY); Future    4. Encounter for weight loss counseling  5. Obesity (BMI 35.0-39.9 without comorbidity) (HCC)  See HCC Gap Form below.     6. Severe anxiety with panic  Chronic, controlled with Lexapro 20 mg qd, no s/e reported, will continue.    - escitalopram (LEXAPRO) 20 MG tablet; Take 1 Tablet by mouth every day.   Dispense: 90 Tablet; Refill: 1    7. Encounter for screening for HIV  - HIV AG/AB COMBO ASSAY SCREENING; Future     HCC Gap Form    Diagnosis: E66.01 - Morbid (severe) obesity due to excess calories (HCC)  Z68.36 - Body mass index (BMI) 36.0-36.9, adult  Comorbidity Diagnosis: Arthritis of left knee  The current BMI is 36.60 kg/m2 as of 12/25/23 13:10 PST  Assessment and plan: Chronic, worsening. Comorbidity: hypertension, hyperlipidemia, osteoarthritis.   Tried and failed Contrave.   I discussed different FDA-approved medications for weight loss. Patient will contact his insurance to inquire about coverage and contact me as needed.   In the mean time, recommended diet and lifestyle modification for weight loss. Appropriate counseling regarding these matters provided.    Last edited 12/25/23 13:17 PST by Korina Castro M.D.           Korina Castro M.D.      Followup: Return for follow up with PCP as directed.    Please note that this dictation was created using voice recognition software. I have made every reasonable attempt to correct obvious errors, but I expect that there are errors of grammar and possibly content that I did not discover before finalizing the note.

## 2023-12-26 ENCOUNTER — TELEPHONE (OUTPATIENT)
Dept: MEDICAL GROUP | Facility: MEDICAL CENTER | Age: 63
End: 2023-12-26
Payer: COMMERCIAL

## 2023-12-26 NOTE — TELEPHONE ENCOUNTER
Caller Name: CLAIR VALENCIA JR   Call Back Number: [phone] (home) [phone] (work)      How would the patient prefer to be contacted with a response: Phone call OK to leave a detailed message    Patient is also having headaches and is wondering if they will be able to see what is happening with the back of his neck while doing imaging for throat  if not can imaging be placed for that as well.

## 2023-12-27 NOTE — TELEPHONE ENCOUNTER
Sorry, the neck concerns were not addressed/discussed during his last office visit.  Therefore, I was not able to order appropriate tests due to the lack of documentation. Patient will need a separate appointment.  Please advise patient.  Thank you

## 2024-01-10 ENCOUNTER — HOSPITAL ENCOUNTER (OUTPATIENT)
Dept: LAB | Facility: MEDICAL CENTER | Age: 64
End: 2024-01-10
Attending: FAMILY MEDICINE
Payer: COMMERCIAL

## 2024-01-10 ENCOUNTER — HOSPITAL ENCOUNTER (OUTPATIENT)
Dept: RADIOLOGY | Facility: MEDICAL CENTER | Age: 64
End: 2024-01-10
Attending: FAMILY MEDICINE
Payer: COMMERCIAL

## 2024-01-10 DIAGNOSIS — K52.9 CHRONIC DIARRHEA: ICD-10-CM

## 2024-01-10 DIAGNOSIS — R22.1 NECK ENLARGEMENT: ICD-10-CM

## 2024-01-10 DIAGNOSIS — M25.561 CHRONIC PAIN OF BOTH KNEES: ICD-10-CM

## 2024-01-10 DIAGNOSIS — Z11.4 ENCOUNTER FOR SCREENING FOR HIV: ICD-10-CM

## 2024-01-10 DIAGNOSIS — E55.9 VITAMIN D DEFICIENCY: ICD-10-CM

## 2024-01-10 DIAGNOSIS — G89.29 CHRONIC PAIN OF BOTH KNEES: ICD-10-CM

## 2024-01-10 DIAGNOSIS — M25.562 CHRONIC PAIN OF BOTH KNEES: ICD-10-CM

## 2024-01-10 PROCEDURE — 82306 VITAMIN D 25 HYDROXY: CPT

## 2024-01-10 PROCEDURE — 73565 X-RAY EXAM OF KNEES: CPT

## 2024-01-10 PROCEDURE — 86364 TISS TRNSGLTMNASE EA IG CLAS: CPT | Mod: 91

## 2024-01-10 PROCEDURE — 86258 DGP ANTIBODY EACH IG CLASS: CPT

## 2024-01-10 PROCEDURE — 84443 ASSAY THYROID STIM HORMONE: CPT

## 2024-01-10 PROCEDURE — 36415 COLL VENOUS BLD VENIPUNCTURE: CPT

## 2024-01-10 PROCEDURE — 87389 HIV-1 AG W/HIV-1&-2 AB AG IA: CPT

## 2024-01-10 PROCEDURE — 76536 US EXAM OF HEAD AND NECK: CPT

## 2024-01-11 LAB
25(OH)D3 SERPL-MCNC: 33 NG/ML (ref 30–100)
HIV 1+2 AB+HIV1 P24 AG SERPL QL IA: NORMAL
TSH SERPL DL<=0.005 MIU/L-ACNC: 2.36 UIU/ML (ref 0.38–5.33)

## 2024-01-12 LAB — TTG IGA SER IA-ACNC: NORMAL FLU (ref 0–4.99)

## 2024-01-13 LAB
GLIADIN IGG SER IA-ACNC: 4.6 FLU (ref 0–4.99)
TTG IGG SER IA-ACNC: 0.96 FLU (ref 0–4.99)

## 2024-01-15 ENCOUNTER — HOSPITAL ENCOUNTER (OUTPATIENT)
Facility: MEDICAL CENTER | Age: 64
End: 2024-01-15
Attending: FAMILY MEDICINE
Payer: COMMERCIAL

## 2024-01-15 DIAGNOSIS — K52.9 CHRONIC DIARRHEA: ICD-10-CM

## 2024-01-15 LAB
C DIFF DNA SPEC QL NAA+PROBE: NEGATIVE
C DIFF TOX GENS STL QL NAA+PROBE: NEGATIVE
G LAMBLIA+C PARVUM AG STL QL RAPID: NORMAL
LACTOFERRIN STL QL IA: NEGATIVE
SIGNIFICANT IND 70042: NORMAL
SITE SITE: NORMAL
SOURCE SOURCE: NORMAL

## 2024-01-15 PROCEDURE — 87209 SMEAR COMPLEX STAIN: CPT

## 2024-01-15 PROCEDURE — 87328 CRYPTOSPORIDIUM AG IA: CPT

## 2024-01-15 PROCEDURE — 83630 LACTOFERRIN FECAL (QUAL): CPT

## 2024-01-15 PROCEDURE — 87493 C DIFF AMPLIFIED PROBE: CPT

## 2024-01-15 PROCEDURE — 83993 ASSAY FOR CALPROTECTIN FECAL: CPT

## 2024-01-15 PROCEDURE — 87177 OVA AND PARASITES SMEARS: CPT

## 2024-01-15 PROCEDURE — 84376 SUGARS SINGLE QUAL: CPT

## 2024-01-15 PROCEDURE — 87329 GIARDIA AG IA: CPT

## 2024-01-17 LAB
CALPROTECTIN STL-MCNT: 6 UG/G
STOOL REDUCING SUBSTANCE 1756: NORMAL

## 2024-01-19 LAB — OVA AND PARASITE, FECAL INTERPRETATION Q0595: NEGATIVE

## 2024-03-02 DIAGNOSIS — I10 ESSENTIAL HYPERTENSION: ICD-10-CM

## 2024-03-05 RX ORDER — LISINOPRIL 10 MG/1
10 TABLET ORAL
Qty: 90 TABLET | Refills: 2 | Status: SHIPPED | OUTPATIENT
Start: 2024-03-05

## 2024-04-24 ENCOUNTER — HOSPITAL ENCOUNTER (OUTPATIENT)
Dept: LAB | Facility: MEDICAL CENTER | Age: 64
End: 2024-04-24
Attending: OPHTHALMOLOGY
Payer: COMMERCIAL

## 2024-04-24 PROCEDURE — 36415 COLL VENOUS BLD VENIPUNCTURE: CPT

## 2024-04-24 PROCEDURE — 80048 BASIC METABOLIC PNL TOTAL CA: CPT

## 2024-04-25 LAB
ANION GAP SERPL CALC-SCNC: 12 MMOL/L (ref 7–16)
BUN SERPL-MCNC: 16 MG/DL (ref 8–22)
CALCIUM SERPL-MCNC: 9 MG/DL (ref 8.5–10.5)
CHLORIDE SERPL-SCNC: 106 MMOL/L (ref 96–112)
CO2 SERPL-SCNC: 24 MMOL/L (ref 20–33)
CREAT SERPL-MCNC: 0.89 MG/DL (ref 0.5–1.4)
GFR SERPLBLD CREATININE-BSD FMLA CKD-EPI: 96 ML/MIN/1.73 M 2
GLUCOSE SERPL-MCNC: 88 MG/DL (ref 65–99)
POTASSIUM SERPL-SCNC: 4.4 MMOL/L (ref 3.6–5.5)
SODIUM SERPL-SCNC: 142 MMOL/L (ref 135–145)

## 2024-08-13 ENCOUNTER — PATIENT MESSAGE (OUTPATIENT)
Dept: HEALTH INFORMATION MANAGEMENT | Facility: OTHER | Age: 64
End: 2024-08-13

## 2024-08-26 ENCOUNTER — DOCUMENTATION (OUTPATIENT)
Dept: HEALTH INFORMATION MANAGEMENT | Facility: OTHER | Age: 64
End: 2024-08-26
Payer: COMMERCIAL

## 2024-08-27 ENCOUNTER — OFFICE VISIT (OUTPATIENT)
Dept: MEDICAL GROUP | Facility: MEDICAL CENTER | Age: 64
End: 2024-08-27
Payer: COMMERCIAL

## 2024-08-27 VITALS
DIASTOLIC BLOOD PRESSURE: 78 MMHG | SYSTOLIC BLOOD PRESSURE: 130 MMHG | OXYGEN SATURATION: 95 % | TEMPERATURE: 97.9 F | BODY MASS INDEX: 37.45 KG/M2 | HEART RATE: 65 BPM | WEIGHT: 276.46 LBS | HEIGHT: 72 IN

## 2024-08-27 DIAGNOSIS — Z00.00 PE (PHYSICAL EXAM), ANNUAL: Primary | ICD-10-CM

## 2024-08-27 DIAGNOSIS — E78.2 MIXED HYPERLIPIDEMIA: ICD-10-CM

## 2024-08-27 DIAGNOSIS — F41.0 SEVERE ANXIETY WITH PANIC: ICD-10-CM

## 2024-08-27 DIAGNOSIS — Z76.89 ENCOUNTER FOR WEIGHT MANAGEMENT: ICD-10-CM

## 2024-08-27 DIAGNOSIS — I10 PRIMARY HYPERTENSION: ICD-10-CM

## 2024-08-27 DIAGNOSIS — E66.01 MORBID OBESITY (HCC): ICD-10-CM

## 2024-08-27 DIAGNOSIS — G47.19 DAYTIME HYPERSOMNOLENCE: ICD-10-CM

## 2024-08-27 DIAGNOSIS — H83.3X3 NOISE-INDUCED HEARING LOSS OF BOTH EARS: ICD-10-CM

## 2024-08-27 DIAGNOSIS — M17.0 PRIMARY OSTEOARTHRITIS OF BOTH KNEES: ICD-10-CM

## 2024-08-27 PROBLEM — Z71.3 ENCOUNTER FOR WEIGHT LOSS COUNSELING: Status: RESOLVED | Noted: 2021-09-15 | Resolved: 2024-08-27

## 2024-08-27 PROBLEM — E66.9 OBESITY (BMI 30-39.9): Status: ACTIVE | Noted: 2024-08-27

## 2024-08-27 RX ORDER — MELOXICAM 15 MG/1
15 TABLET ORAL
Qty: 60 TABLET | Refills: 0 | Status: SHIPPED | OUTPATIENT
Start: 2024-08-27

## 2024-08-27 ASSESSMENT — PATIENT HEALTH QUESTIONNAIRE - PHQ9: CLINICAL INTERPRETATION OF PHQ2 SCORE: 0

## 2024-08-28 PROBLEM — M17.0 PRIMARY OSTEOARTHRITIS OF BOTH KNEES: Status: ACTIVE | Noted: 2023-07-20

## 2024-08-28 PROBLEM — M17.11 ARTHRITIS OF RIGHT KNEE: Status: RESOLVED | Noted: 2021-08-02 | Resolved: 2024-08-28

## 2024-08-28 PROBLEM — M17.12 ARTHRITIS OF LEFT KNEE: Status: RESOLVED | Noted: 2021-08-02 | Resolved: 2024-08-28

## 2024-08-28 PROBLEM — E66.9 OBESITY (BMI 35.0-39.9 WITHOUT COMORBIDITY): Status: RESOLVED | Noted: 2017-10-09 | Resolved: 2024-08-28

## 2024-08-28 PROBLEM — N52.9 ERECTILE DYSFUNCTION: Status: RESOLVED | Noted: 2020-11-24 | Resolved: 2024-08-28

## 2024-08-28 PROBLEM — E66.01 MORBID OBESITY (HCC): Status: ACTIVE | Noted: 2024-08-27

## 2024-08-28 PROBLEM — H83.3X3 NOISE-INDUCED HEARING LOSS OF BOTH EARS: Status: ACTIVE | Noted: 2024-08-28

## 2024-08-28 PROBLEM — E55.9 VITAMIN D DEFICIENCY: Status: RESOLVED | Noted: 2017-10-09 | Resolved: 2024-08-28

## 2024-08-28 NOTE — PROGRESS NOTES
Verbal consent was acquired by the patient to use [employer] ambient listening note generation during this visit: YES    CC:  aPE, bilateral knee pain, weight loss    Assessment & Plan:     1. Severe anxiety with panic  Chronic, controlled with Lexapro 20 mg qd, no s/e reported, will continue.    - avoid alcohol, drugs  - regular exercises, weight loss    2. Primary hypertension  Chronic, controlled with Lisinopril 10 mg qd, no s/e reported, will continue.      3. Mixed hyperlipidemia  Chronic, not currently taking medications. Body mass index is 37.49 kg/m². Patient experienced worsening weight gain over the past few months/years. He is active, but does not exercise regularly. Negative history of drugs, alcohol abuse. Will repeat labs for reassessment.   - dietary modification, regular exercise  - weight loss   - avoid alcohol, illicit drugs, tobacco products     4. PE (physical exam), annual  Labs per orders  Immunization reviewed and discussed  Patient was counseled about  diet, supplements, exercises.   Preventive cares reviewed, discussed, and updated as appropriate.     - CBC WITH DIFFERENTIAL; Future  - Comp Metabolic Panel; Future  - HEMOGLOBIN A1C; Future  - MICROALBUMIN CREAT RATIO URINE; Future  - Lipid Profile; Future  - TSH WITH REFLEX TO FT4; Future    5. Daytime hypersomnolence  History and exam are concerning for ANANTH.  STOPBANG score: 8 (8/27/2024  9:26 AM)    - Overnight Home Sleep Study; Future  - weight loss, addressed below.   - avoid alcohol and sedatives     6. Obesity (BMI 30-39.9)  7. Encounter for weight management  Body mass index is 37.49 kg/m².   Patient complains of worsening weight gain over the past few months/years due to sedentary lifestyle. He is not able to exercise due to ongoing bilateral knee pain. He previously tried Contrave with some success. He would like to retry Contrave again. His insurance does not cover Wegovy or Zepbound.   - Naltrexone-buPROPion HCl ER 8-90 MG TABLET  SR 12 HR; Take 2 Tablets by mouth 2 times a day.  Dispense: 120 Tablet; Refill: 2  - Naltrexone-buPROPion HCl ER 8-90 MG TABLET SR 12 HR; Take 1 tablet once daily in the morning for 1 week; followed by 1 tablet twice daily for 1 week; followed by 2 tablets in the morning and 1 tablet in the evening for 1 week; then followed by 2 tablets twice daily thereafter.  Dispense: 120 Tablet; Refill: 2  - side effects and expectation discussed  - regular exercises as tolerated   - diet modification     8. Primary osteoarthritis of both knees  History and exam are concerning for bilateral knee osteoarthritis.   Bilateral knee X-ray done in 1/2024 showed mild, symmetric medial compartment osteoarthritis bilaterally.   - I discussed pathogenesis of this conditions.   - patient is asymptomatic today.   - weight loss, regular non-weigh-bearing exercises.   - activity modification   - discussed treatment options when conservative treatment fail to improve symptoms.   - prescribed Meloxicam to be taken only as needed: meloxicam (MOBIC) 15 MG tablet; Take 1 Tablet by mouth 1 time a day as needed for Inflammation, Severe Pain or Moderate Pain (knee pain). With foods  Dispense: 60 Tablet; Refill: 0  - follow up with PCP as needed.     9. Bilateral hearing loss   Likely secondary to chronic loud sounds exposure during his youth. He also complains of low frequency sound continuously, but not bothersome. He wears hearing aids bilaterally. Bilateral ear exam unremarkable today.   - continue hearing aids and follow up with audiology as directed.         Subjective:       HPI:   History of Present Illness  The patient is a 64-year-old male who presents for evaluation of multiple medical concerns.    He was diagnosed with tinnitus 6 to 7 years ago, characterized by a constant ringing noise in his ears. Currently, he experiences a low-frequency shushing sound, which is faint and often unnoticeable. The sound seems to originate from one side  but occasionally appears to come from both sides. He reports no ear pain or history of trauma. He also mentions occasional dizziness when walking long distances and turning corners quickly. 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 He uses hearing aids, which he forgot to bring today.    Patient has been experiencing intermittent development of severe knee pain lasting for approximately 5 to 7 days then resolved spontaneously without any treatment.  Acute exacerbation of knee pain my all my not associated with activity.  Denies history of knee surgery or trauma.  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  He had knee x-ray done in 1/2024 which showed mild symmetric bilateral medial compartment osteoarthritis.  He was told that he might not knee replacement in the future.  However, he is not ready for this.  He is asymptomatic today.  Denies familial history of rheumatoid arthritis.  Negative history of trauma or surgery to the knees.  Patient is retired.  However, he used to work as a .  His past employment requires prolonged standing and ambulation.    Patient complains of persistent weight gain over the past few years leading to difficulty with activity and breathing even at rest.  He tried contrave in the past with some success.  However, he experiences rebound weight gain after discontinuation of this medication.  His insurance does not cover Wegovy or Zepbound.  He wishes to try contrave again.  He also complains of excessive snoring, feeling unrefreshed in the morning, daytime hypersomnolence, persistent tiredness and exhaustion.  He does not drink soda or alcohol regularly.  Negative history of tobacco abuse.  He admits to sedentary lifestyle due to knee pain.        Current Outpatient Medications:     meloxicam (MOBIC) 15 MG tablet, Take 1 Tablet by mouth 1 time a day as needed for Inflammation, Severe Pain or Moderate Pain (knee pain). With foods, Disp: 60 Tablet,  Rfl: 0    Naltrexone-buPROPion HCl ER 8-90 MG TABLET SR 12 HR, Take 2 Tablets by mouth 2 times a day., Disp: 120 Tablet, Rfl: 2    Naltrexone-buPROPion HCl ER 8-90 MG TABLET SR 12 HR, Take 1 tablet once daily in the morning for 1 week; followed by 1 tablet twice daily for 1 week; followed by 2 tablets in the morning and 1 tablet in the evening for 1 week; then followed by 2 tablets twice daily thereafter., Disp: 120 Tablet, Rfl: 2    lisinopril (PRINIVIL) 10 MG Tab, TAKE 1 TABLET BY MOUTH EVERY DAY, Disp: 90 Tablet, Rfl: 2    escitalopram (LEXAPRO) 20 MG tablet, Take 1 Tablet by mouth every day., Disp: 90 Tablet, Rfl: 1    propranolol (INDERAL) 40 MG Tab, TAKE 1 TABLET BY MOUTH TWICE A DAY, Disp: 180 Tablet, Rfl: 3    vitamin D, Ergocalciferol, (DRISDOL) 14966 units Cap capsule, Take 50,000 Units by mouth every 7 days., Disp: , Rfl: 2     Objective:     Exam:  /78 (BP Location: Left arm, Patient Position: Sitting, BP Cuff Size: Large adult)   Pulse 65   Temp 36.6 °C (97.9 °F) (Temporal)   Ht 1.829 m (6')   Wt (!) 125 kg (276 lb 7.3 oz)   SpO2 95%   BMI 37.49 kg/m²  Body mass index is 37.49 kg/m².    Constitutional: awake, alert, in no distress.  Morbidly obese.  Skin: Warm, dry, good turgor, no rashes, bruises, ulcers in visible areas.  Eye: conjunctiva clear, lids neg for edema or lesions.  ENMT: TM and auditory canals wnl.  Pale and congested nasal mucosa. Lips without lesions, good dentition, oropharynx clear.  Neck: Trachea midline, no masses, no thyromegaly. No cervical or supraclavicular lymphadenopathy  Respiratory: Unlabored respiratory effort, lungs clear to auscultation, no wheezes, no rales.  Cardiovascular: Normal S1, S2, no murmur, no pedal edema.  Psych: Oriented x3, affect and mood wnl, intact judgement and insight.   MSK: unremarkable bilateral knee exam.       Return in about 3 months (around 11/27/2024) for Weight management.          Please note that this dictation was created using  voice recognition software. I have made every reasonable attempt to correct obvious errors, but I expect that there are errors of grammar and possibly content that I did not discover before finalizing the note.

## 2024-08-29 ENCOUNTER — TELEPHONE (OUTPATIENT)
Dept: MEDICAL GROUP | Facility: MEDICAL CENTER | Age: 64
End: 2024-08-29
Payer: COMMERCIAL

## 2024-09-06 DIAGNOSIS — F41.0 SEVERE ANXIETY WITH PANIC: ICD-10-CM

## 2024-09-10 RX ORDER — ESCITALOPRAM OXALATE 20 MG/1
20 TABLET ORAL
Qty: 90 TABLET | Refills: 0 | Status: SHIPPED | OUTPATIENT
Start: 2024-09-10

## 2024-09-11 ENCOUNTER — TELEPHONE (OUTPATIENT)
Dept: HEALTH INFORMATION MANAGEMENT | Facility: OTHER | Age: 64
End: 2024-09-11
Payer: COMMERCIAL

## 2024-10-23 ENCOUNTER — APPOINTMENT (OUTPATIENT)
Dept: SLEEP MEDICINE | Facility: MEDICAL CENTER | Age: 64
End: 2024-10-23
Attending: FAMILY MEDICINE
Payer: COMMERCIAL

## 2024-10-23 DIAGNOSIS — G47.19 DAYTIME HYPERSOMNOLENCE: ICD-10-CM

## 2024-10-27 DIAGNOSIS — M17.0 PRIMARY OSTEOARTHRITIS OF BOTH KNEES: ICD-10-CM

## 2024-10-28 RX ORDER — MELOXICAM 15 MG/1
15 TABLET ORAL
Qty: 60 TABLET | Refills: 0 | Status: SHIPPED | OUTPATIENT
Start: 2024-10-28

## 2024-10-30 ENCOUNTER — APPOINTMENT (OUTPATIENT)
Dept: SLEEP MEDICINE | Facility: MEDICAL CENTER | Age: 64
End: 2024-10-30
Payer: COMMERCIAL

## 2024-10-30 PROCEDURE — 95800 SLP STDY UNATTENDED: CPT | Performed by: STUDENT IN AN ORGANIZED HEALTH CARE EDUCATION/TRAINING PROGRAM

## 2024-11-06 NOTE — PROCEDURES
DIAGNOSTIC HOME SLEEP TEST (HST) REPORT WatchPAT      PATIENT ID:  NAME:  CLAIR VALENCIA JR  MRN:               [id number]  YOB: 1960  DATE OF STUDY: 10/30/2024      Impression:     This study shows evidence of:      1. Severe obstructive sleep apnea with PAT apnea hypopnea index(pAHI) of 60.4 per hour.  PAT respiratory disturbance index (pRDI) was 60.4 per hour. These findings are based on 7 channels recording of PAT signal with sleep staging, heart rate, pulse oximetry, actigraphy, body position, snoring and respiratory movement.     2. Oxygenation O2 Sat. mean O2 sat was 91%,  karan was 79%,  and maximum O2 at 98 %. O2 sat was at or  below 88% for 27.9 min of evaluation time. Oxygen Desaturation (>=4%) Index was 47.4/hr. AVG HR was 54 BPM.      TECHNICAL DESCRIPTION: Patient underwent home sleep apnea testing with peripheral arterial tone signal (WatchPAT™). This is a Type IV portable monitor and device per Medicare. Monitoring was done with 7 channels recording of PAT signal with sleep staging, heart rate, pulse oximetry, actigraphy, body position, snoring and respiratory movement. Prior to using the device, the patient received verbal and written instructions for its application and was provided with the help desk phone number for additional telephonic instruction with 24-hour availability of qualified personnel to answer questions.    Respiratory events:    pRDI: Total 371 (REM index 63.1/hr. NREM index 59.9/hr, All Night 60.4/hr)    3% pAHI: Total 371 (REM index 63.1/hr. NREM index 59.9/hr, All Night 60.4/hr)    3% pAHIc: Total 18 ( All Night 2.9/hr)    4% pAHI: Total 279 (All Night 45.4/hr)    General sleep summary: . Total recording time is 6 hours and 58 minutes and total Sleep time is 6 hours and 27 minutes. The patient spent 261 minutes in the supine position and 127 minutes in the nonsupine position.      Recommendations:    1. CPAP titration study vs Auto CPAP trial.  If  starting auto CPAP would recommend minimum pressure 5 cmH2O maximum pressure 15 cmH2O.  2. In general patients with sleep apnea are advised to avoid alcohol and sedatives and to not operate a motor vehicle while drowsy. In some cases alternative treatment options may prove effective in resolving sleep apnea in these options include upper airway surgery, the use of a dental orthotic or weight loss and positional therapy. Clinical correlation is required.         Petros Patterson MD

## 2024-11-07 PROBLEM — G47.33 OSA (OBSTRUCTIVE SLEEP APNEA): Status: ACTIVE | Noted: 2024-11-07

## 2024-11-18 ENCOUNTER — HOSPITAL ENCOUNTER (OUTPATIENT)
Dept: LAB | Facility: MEDICAL CENTER | Age: 64
End: 2024-11-18
Attending: FAMILY MEDICINE
Payer: COMMERCIAL

## 2024-11-18 DIAGNOSIS — Z00.00 PE (PHYSICAL EXAM), ANNUAL: ICD-10-CM

## 2024-11-18 LAB
ALBUMIN SERPL BCP-MCNC: 4 G/DL (ref 3.2–4.9)
ALBUMIN/GLOB SERPL: 1.4 G/DL
ALP SERPL-CCNC: 66 U/L (ref 30–99)
ALT SERPL-CCNC: 21 U/L (ref 2–50)
ANION GAP SERPL CALC-SCNC: 10 MMOL/L (ref 7–16)
AST SERPL-CCNC: 23 U/L (ref 12–45)
BASOPHILS # BLD AUTO: 0.7 % (ref 0–1.8)
BASOPHILS # BLD: 0.05 K/UL (ref 0–0.12)
BILIRUB SERPL-MCNC: 0.4 MG/DL (ref 0.1–1.5)
BUN SERPL-MCNC: 14 MG/DL (ref 8–22)
CALCIUM ALBUM COR SERPL-MCNC: 9.1 MG/DL (ref 8.5–10.5)
CALCIUM SERPL-MCNC: 9.1 MG/DL (ref 8.5–10.5)
CHLORIDE SERPL-SCNC: 108 MMOL/L (ref 96–112)
CHOLEST SERPL-MCNC: 194 MG/DL (ref 100–199)
CO2 SERPL-SCNC: 23 MMOL/L (ref 20–33)
CREAT SERPL-MCNC: 0.89 MG/DL (ref 0.5–1.4)
CREAT UR-MCNC: 156.91 MG/DL
EOSINOPHIL # BLD AUTO: 0.16 K/UL (ref 0–0.51)
EOSINOPHIL NFR BLD: 2.3 % (ref 0–6.9)
ERYTHROCYTE [DISTWIDTH] IN BLOOD BY AUTOMATED COUNT: 43.5 FL (ref 35.9–50)
EST. AVERAGE GLUCOSE BLD GHB EST-MCNC: 128 MG/DL
FASTING STATUS PATIENT QL REPORTED: NORMAL
GFR SERPLBLD CREATININE-BSD FMLA CKD-EPI: 96 ML/MIN/1.73 M 2
GLOBULIN SER CALC-MCNC: 2.8 G/DL (ref 1.9–3.5)
GLUCOSE SERPL-MCNC: 101 MG/DL (ref 65–99)
HBA1C MFR BLD: 6.1 % (ref 4–5.6)
HCT VFR BLD AUTO: 45.3 % (ref 42–52)
HDLC SERPL-MCNC: 32 MG/DL
HGB BLD-MCNC: 15 G/DL (ref 14–18)
IMM GRANULOCYTES # BLD AUTO: 0.03 K/UL (ref 0–0.11)
IMM GRANULOCYTES NFR BLD AUTO: 0.4 % (ref 0–0.9)
LDLC SERPL CALC-MCNC: 137 MG/DL
LYMPHOCYTES # BLD AUTO: 1.6 K/UL (ref 1–4.8)
LYMPHOCYTES NFR BLD: 23.5 % (ref 22–41)
MCH RBC QN AUTO: 30.9 PG (ref 27–33)
MCHC RBC AUTO-ENTMCNC: 33.1 G/DL (ref 32.3–36.5)
MCV RBC AUTO: 93.2 FL (ref 81.4–97.8)
MICROALBUMIN UR-MCNC: <1.2 MG/DL
MICROALBUMIN/CREAT UR: NORMAL MG/G (ref 0–30)
MONOCYTES # BLD AUTO: 0.49 K/UL (ref 0–0.85)
MONOCYTES NFR BLD AUTO: 7.2 % (ref 0–13.4)
NEUTROPHILS # BLD AUTO: 4.48 K/UL (ref 1.82–7.42)
NEUTROPHILS NFR BLD: 65.9 % (ref 44–72)
NRBC # BLD AUTO: 0 K/UL
NRBC BLD-RTO: 0 /100 WBC (ref 0–0.2)
PLATELET # BLD AUTO: 292 K/UL (ref 164–446)
PMV BLD AUTO: 10.3 FL (ref 9–12.9)
POTASSIUM SERPL-SCNC: 4.3 MMOL/L (ref 3.6–5.5)
PROT SERPL-MCNC: 6.8 G/DL (ref 6–8.2)
RBC # BLD AUTO: 4.86 M/UL (ref 4.7–6.1)
SODIUM SERPL-SCNC: 141 MMOL/L (ref 135–145)
TRIGL SERPL-MCNC: 123 MG/DL (ref 0–149)
TSH SERPL DL<=0.005 MIU/L-ACNC: 2.55 UIU/ML (ref 0.38–5.33)
WBC # BLD AUTO: 6.8 K/UL (ref 4.8–10.8)

## 2024-11-18 PROCEDURE — 82043 UR ALBUMIN QUANTITATIVE: CPT

## 2024-11-18 PROCEDURE — 80053 COMPREHEN METABOLIC PANEL: CPT

## 2024-11-18 PROCEDURE — 83036 HEMOGLOBIN GLYCOSYLATED A1C: CPT

## 2024-11-18 PROCEDURE — 85025 COMPLETE CBC W/AUTO DIFF WBC: CPT

## 2024-11-18 PROCEDURE — 82570 ASSAY OF URINE CREATININE: CPT

## 2024-11-18 PROCEDURE — 80061 LIPID PANEL: CPT

## 2024-11-18 PROCEDURE — 36415 COLL VENOUS BLD VENIPUNCTURE: CPT

## 2024-11-18 PROCEDURE — 84443 ASSAY THYROID STIM HORMONE: CPT

## 2024-12-05 ENCOUNTER — OFFICE VISIT (OUTPATIENT)
Dept: MEDICAL GROUP | Facility: MEDICAL CENTER | Age: 64
End: 2024-12-05
Payer: COMMERCIAL

## 2024-12-05 VITALS
HEART RATE: 60 BPM | OXYGEN SATURATION: 95 % | BODY MASS INDEX: 35.35 KG/M2 | HEIGHT: 72 IN | SYSTOLIC BLOOD PRESSURE: 128 MMHG | WEIGHT: 261 LBS | TEMPERATURE: 98.1 F | DIASTOLIC BLOOD PRESSURE: 76 MMHG

## 2024-12-05 DIAGNOSIS — I10 PRIMARY HYPERTENSION: ICD-10-CM

## 2024-12-05 DIAGNOSIS — E66.01 MORBID OBESITY (HCC): ICD-10-CM

## 2024-12-05 DIAGNOSIS — F41.0 SEVERE ANXIETY WITH PANIC: ICD-10-CM

## 2024-12-05 DIAGNOSIS — Z23 NEED FOR VACCINATION: ICD-10-CM

## 2024-12-05 DIAGNOSIS — G47.33 OSA ON CPAP: ICD-10-CM

## 2024-12-05 PROCEDURE — 3074F SYST BP LT 130 MM HG: CPT | Performed by: FAMILY MEDICINE

## 2024-12-05 PROCEDURE — 90715 TDAP VACCINE 7 YRS/> IM: CPT | Performed by: FAMILY MEDICINE

## 2024-12-05 PROCEDURE — 90471 IMMUNIZATION ADMIN: CPT | Performed by: FAMILY MEDICINE

## 2024-12-05 PROCEDURE — 3078F DIAST BP <80 MM HG: CPT | Performed by: FAMILY MEDICINE

## 2024-12-05 PROCEDURE — 99214 OFFICE O/P EST MOD 30 MIN: CPT | Mod: 25 | Performed by: FAMILY MEDICINE

## 2024-12-05 ASSESSMENT — FIBROSIS 4 INDEX: FIB4 SCORE: 1.08

## 2024-12-08 DIAGNOSIS — F41.0 SEVERE ANXIETY WITH PANIC: ICD-10-CM

## 2024-12-08 DIAGNOSIS — I10 ESSENTIAL HYPERTENSION: ICD-10-CM

## 2024-12-08 NOTE — PROGRESS NOTES
Verbal consent was acquired by the patient to use [employer] ambient listening note generation during this visit: YES    CC: weight loss follow up     Assessment & Plan:     1. Need for vaccination  - Tdap Vaccine =>8YO IM    2. Morbid obesity (HCC)  Body mass index is 35.4 kg/m².   Patient was previously prescribed contrave for weight loss.  However, patient was not able to  this medication and decided to work on dietary and lifestyle modification to lose weight.  Since last office visit he lost about 15 pounds.   Patient wishes to continue to lose weight on his own.  - Patient identified as having weight management issue.  Appropriate orders and counseling given.   -Follow-up with PCP for routine monitoring of this condition.    3. Primary hypertension  Chronic, controlled with lisinopril 10 mg daily, no s/e reported, will continue.      4. Severe anxiety with panic  Chronic, controlled with Lexapro 20 mg daily and propranolol 40 mg twice daily, no s/e reported, will continue.      5. ANANTH on CPAP  Recent polysomnography was notable for severe sleep apnea with AHI of 60.4.  Mean O2 saturation was 91%, karan was 79%, and O2 sat below 88% was 27.9-minutes.  Average heart rate was 54 bpm.  I discussed treatment option with patient.  Patient would like to try a trial of AutoPap.  He has been working on weight loss.  He lost approximately 15 pounds in last office visit in August 2024.  Compared to 1/2024, he lost approximately 20 pounds. Body mass index is 35.4 kg/m².   - DME CPAP  -Additional weight loss encouraged  -Avoid alcohol and sedatives  -Follow-up in 4 months.  Patient was advised to contact our clinic if he is not contacted regarding the CPAP in 2 weeks.    Subjective:       HPI:     History of Present Illness  The patient presents for evaluation of multiple medical concerns.    He reports a weight loss of approximately 15 pounds in last office visit.  Since earlier this year, he lost approximately 20  pounds.  He has been managing his weight independently, without medication. His diet includes scrambled eggs with moralez bits for breakfast, a turkey and cheddar cheese bagel for lunch, and roasted chicken with fried zucchini for dinner. He is considering weight loss medication.    He was recently diagnosed with sleep apnea.  He wishes to discuss treatment options.  Denies insomnia.  He endorses daytime hypersomnolence and chronic fatigue.    He had his lower eyelid procedure done by Dr. Carlin in 08/2024 or 09/2024. It is much better now, and the dryness is gone.    8 anxiety disorder is controlled with Lexapro 20 mg daily.  He also takes propranolol 40 mg twice daily.      Current Outpatient Medications:     meloxicam (MOBIC) 15 MG tablet, TAKE 1 TABLET BY MOUTH 1 TIME A DAY AS NEEDED FOR INFLAMMATION, SEVERE PAIN OR MODERATE PAIN (KNEE PAIN). WITH FOODS, Disp: 60 Tablet, Rfl: 0    escitalopram (LEXAPRO) 20 MG tablet, TAKE 1 TABLET BY MOUTH EVERY DAY, Disp: 90 Tablet, Rfl: 0    lisinopril (PRINIVIL) 10 MG Tab, TAKE 1 TABLET BY MOUTH EVERY DAY, Disp: 90 Tablet, Rfl: 2    propranolol (INDERAL) 40 MG Tab, TAKE 1 TABLET BY MOUTH TWICE A DAY, Disp: 180 Tablet, Rfl: 3    vitamin D, Ergocalciferol, (DRISDOL) 30673 units Cap capsule, Take 50,000 Units by mouth every 7 days., Disp: , Rfl: 2     Objective:     Exam:  /76 (BP Location: Right arm, Patient Position: Sitting, BP Cuff Size: Large adult)   Pulse 60   Temp 36.7 °C (98.1 °F) (Temporal)   Ht 1.829 m (6')   Wt 118 kg (261 lb)   SpO2 95%   BMI 35.40 kg/m²  Body mass index is 35.4 kg/m².    Constitutional: awake, alert, in no distress.  Skin: Warm, dry, good turgor, no rashes, bruises, ulcers in visible areas.  Eye: conjunctiva clear, lids neg for edema or lesions.  ENMT: TM and auditory canals wnl. Oral and nasal mucosa wnl. Lips without lesions, good dentition, oropharynx clear.  Neck: Trachea midline, no masses, no thyromegaly. No cervical or supraclavicular  lymphadenopathy  Respiratory: Unlabored respiratory effort, lungs clear to auscultation, no wheezes, no rales.  Cardiovascular: Normal S1, S2, no murmur, no pedal edema.  Psych: Oriented x3, affect and mood wnl, intact judgement and insight.         Return in about 3 months (around 3/5/2025) for Multiple issues.          Please note that this dictation was created using voice recognition software. I have made every reasonable attempt to correct obvious errors, but I expect that there are errors of grammar and possibly content that I did not discover before finalizing the note.

## 2024-12-10 RX ORDER — ESCITALOPRAM OXALATE 20 MG/1
20 TABLET ORAL
Qty: 90 TABLET | Refills: 0 | Status: SHIPPED | OUTPATIENT
Start: 2024-12-10

## 2024-12-10 RX ORDER — PROPRANOLOL HYDROCHLORIDE 40 MG/1
TABLET ORAL
Qty: 180 TABLET | Refills: 3 | Status: SHIPPED | OUTPATIENT
Start: 2024-12-10

## 2025-02-15 DIAGNOSIS — I10 ESSENTIAL HYPERTENSION: ICD-10-CM

## 2025-02-20 RX ORDER — LISINOPRIL 10 MG/1
10 TABLET ORAL
Qty: 90 TABLET | Refills: 2 | Status: SHIPPED | OUTPATIENT
Start: 2025-02-20

## 2025-03-12 DIAGNOSIS — F41.0 SEVERE ANXIETY WITH PANIC: ICD-10-CM

## 2025-03-13 RX ORDER — ESCITALOPRAM OXALATE 20 MG/1
20 TABLET ORAL
Qty: 90 TABLET | Refills: 0 | Status: SHIPPED | OUTPATIENT
Start: 2025-03-13

## 2025-03-31 ENCOUNTER — APPOINTMENT (OUTPATIENT)
Dept: RADIOLOGY | Facility: MEDICAL CENTER | Age: 65
End: 2025-03-31
Attending: EMERGENCY MEDICINE
Payer: COMMERCIAL

## 2025-03-31 ENCOUNTER — HOSPITAL ENCOUNTER (EMERGENCY)
Facility: MEDICAL CENTER | Age: 65
End: 2025-03-31
Attending: EMERGENCY MEDICINE
Payer: COMMERCIAL

## 2025-03-31 VITALS
HEIGHT: 72 IN | HEART RATE: 65 BPM | SYSTOLIC BLOOD PRESSURE: 155 MMHG | OXYGEN SATURATION: 94 % | TEMPERATURE: 96.7 F | WEIGHT: 270.5 LBS | RESPIRATION RATE: 18 BRPM | DIASTOLIC BLOOD PRESSURE: 73 MMHG | BODY MASS INDEX: 36.64 KG/M2

## 2025-03-31 DIAGNOSIS — S50.01XA CONTUSION OF RIGHT ELBOW, INITIAL ENCOUNTER: ICD-10-CM
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
## 2025-03-31 DIAGNOSIS — S00.01XA ABRASION OF SCALP, INITIAL ENCOUNTER: ICD-10-CM

## 2025-03-31 PROCEDURE — 70450 CT HEAD/BRAIN W/O DYE: CPT

## 2025-03-31 PROCEDURE — 99284 EMERGENCY DEPT VISIT MOD MDM: CPT

## 2025-03-31 ASSESSMENT — FIBROSIS 4 INDEX: FIB4 SCORE: 1.08

## 2025-04-01 NOTE — ED TRIAGE NOTES
Chief Complaint   Patient presents with    T-5000 GLF     Pt thought he was on the bottom rung of the ladder and stepped off to the side landing on his R side, hitting his R elbow and the back of his R head. Pt has abrasion to R occipital region. No LOC, no thinners     Pt ambulatory into triage for above. Pt states he has some R elbow pain as well. Pt aox4 gcs 15.               BP (!) 157/74   Pulse 72   Temp 35.9 °C (96.7 °F) (Temporal)   Resp 16   Ht 1.829 m (6')   Wt 123 kg (270 lb 8.1 oz)   SpO2 97%   BMI 36.69 kg/m²

## 2025-04-01 NOTE — ED NOTES
Patient discharged home per ERP  Discharge teaching and education discussed with patient.  Patient verbalized understanding of discharge teaching and education. No other questions at this time.    VSS. Patient alert and oriented.  Patient's ride arranged by self/family  Able to ambulate off unit safely with steady gait.  All belongings with patient at time of discharge.

## 2025-04-01 NOTE — ED PROVIDER NOTES
ER Provider Note    Scribed for Scooter Rivers M.D. by Brian Vieira. 3/31/2025   6:34 PM    Primary Care Provider: Kaylene Nova M.D.    CHIEF COMPLAINT  Chief Complaint   Patient presents with    T-5000 GLF     Pt thought he was on the bottom rung of the ladder and stepped off to the side landing on his R side, hitting his R elbow and the back of his R head. Pt has abrasion to R occipital region. No LOC, no thinners     EXTERNAL RECORDS REVIEWED  Outpatient Notes Patient seen in office for regular check up.     HPI/ROS  LIMITATION TO HISTORY   Select: : None  OUTSIDE HISTORIAN(S):  Significant other arrives with the patient today for support.     CLAIR VALENCIA JR is a 64 y.o. male who presents to the ED for evaluation of a head injury after a fall onset prior to arrival today. Patient reports that he was on the ladder at work today and missed the last step of the ladder, causing him to fall backwards. He reports landing on the right side of his body on the concrete floor, and he did hit the back of his head. He has sustained a laceration to the back of his head, and says he did not lose consciousness but did lay on the ground for a while before getting up. He was able to stand after about a minute, and then continued his work. He reports having some head pain after the fall, but feels improved at this time. Patient does not take any anticoagulants or aspirin.     PAST MEDICAL HISTORY  Past Medical History:   Diagnosis Date    Chest pain at rest 1/13/2014    Cough 3/1/2016    Depression     HTN     Hyperlipidemia 10/10/2014    Hypertension     Situational mixed anxiety and depressive disorder 1/13/2014       SURGICAL HISTORY  Past Surgical History:   Procedure Laterality Date    TONSILLECTOMY         FAMILY HISTORY  Family History   Problem Relation Age of Onset    Heart Disease Father        SOCIAL HISTORY   reports that he has never smoked. He has never used smokeless tobacco. He reports current alcohol  use of about 1.0 oz of alcohol per week. He reports that he does not use drugs.    CURRENT MEDICATIONS  Previous Medications    ESCITALOPRAM (LEXAPRO) 20 MG TABLET    TAKE 1 TABLET BY MOUTH EVERY DAY    LISINOPRIL (PRINIVIL) 10 MG TAB    TAKE 1 TABLET BY MOUTH EVERY DAY    MELOXICAM (MOBIC) 15 MG TABLET    TAKE 1 TABLET BY MOUTH 1 TIME A DAY AS NEEDED FOR INFLAMMATION, SEVERE PAIN OR MODERATE PAIN (KNEE PAIN). WITH FOODS    PROPRANOLOL (INDERAL) 40 MG TAB    TAKE 1 TABLET BY MOUTH TWICE A DAY    VITAMIN D, ERGOCALCIFEROL, (DRISDOL) 48826 UNITS CAP CAPSULE    Take 50,000 Units by mouth every 7 days.       ALLERGIES  Allergies   Allergen Reactions    Pcn [Penicillins]      Pt unsure if he has allergy but dad had anaphylaxis        PHYSICAL EXAM  BP (!) 157/74   Pulse 72   Temp 35.9 °C (96.7 °F) (Temporal)   Resp 16   Ht 1.829 m (6')   Wt 123 kg (270 lb 8.1 oz)   SpO2 97%   BMI 36.69 kg/m²    Nursing note and vitals reviewed.  Constitutional: Well-developed and well-nourished. No distress.   HENT: Head is normocephalic. Occipital cephalohematoma.. Oropharynx is clear and moist without exudate or erythema.   Eyes: Pupils are equal, round, and reactive to light. Conjunctiva are normal.   Cardiovascular: Normal rate and regular rhythm. No murmur heard. Normal radial pulses.  Pulmonary/Chest: Breath sounds normal. No wheezes or rales.   Abdominal: Soft and non-tender. No distention    Musculoskeletal: Extremities exhibit normal range of motion.  Mild tenderness to the elbow but he is able to extend the elbow against resistance.   Neurological: Awake, alert and oriented to person, place, and time. No focal deficits noted.  Skin: Skin is warm and dry. No rash.   Psychiatric: Normal mood and affect. Appropriate for clinical situation    DIAGNOSTIC STUDIES    Radiology:   This attending emergency physician has independently interpreted the diagnostic imaging associated with this visit and is awaiting the final reading  from the radiologist.   Preliminary interpretation is a follows: CT head shows no intracranial hemorrhage or skull fracture    Radiologist interpretation:   CT-HEAD W/O   Final Result      1. Right posterior parietal scalp soft tissue swelling.   2. No acute intracranial abnormality.   3. Atrophy and diffuse chronic microangiopathic white matter changes versus demyelination or gliosis.   4. 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                    ASSESSMENT AND PLAN    6:34 PM - Patient was evaluated at bedside. Patient arrives today with an occipital cephalohematoma after he fell from the bottom of a ladder at work. Ordered for CT-Head to evaluate. Patient verbalizes understanding and support with my plan of care. He will undergo evaluation for traumatic injury.     CT head shows no evidence of intracranial hemorrhage or skull fracture.    8:04 PM   - I reevaluated the patient at bedside. I discussed the patient's diagnostic study results which show no hemorrhage or other traumatic injuries. I discussed plan for discharge and follow up as outlined below. The patient is stable for discharge at this time and will return for any new or worsening symptoms. Patient verbalizes understanding and support with my plan for discharge.      DISPOSITION AND DISCUSSIONS    I have discussed management of the patient with the following physicians and NAEEM's:  None.     Discussion of management with other [facility] or appropriate source(s): None        The patient will return for new or worsening symptoms and is stable at the time of discharge.    DISPOSITION:  Patient will be discharged home in stable condition.    FOLLOW UP:  Kaylene Nova M.D.  [address] [facility]  [address] [place] [id number] [phone]    Schedule an appointment as soon as possible for a visit       [facility], Emergency Dept  [address] [facility] [address] [phone]    If symptoms worsen      OUTPATIENT MEDICATIONS:  New  Prescriptions    No medications on file        FINAL DIAGNOSIS  1. Fall, initial encounter    2. Abrasion of scalp, initial encounter    3. Contusion of right elbow, initial encounter         I, Brian Vieira (Scribe), am scribing for, and in the presence of, Scooter Rivers M.D..    Electronically signed by: Brian Vieira (Ashwiniibe), 3/31/2025    IScooter M.D. personally performed the services described in this documentation, as scribed by Brian Vieira in my presence, and it is both accurate and complete.      The note accurately reflects work and decisions made by me.  Scooter Rivers M.D.  3/31/2025  8:04 PM

## 2025-04-01 NOTE — ED NOTES
"Pt ambulated to Luis 20. Pt reporting mild tenderness to back of head. Pain 1/10. Swelling and small laceration with dried blood to posterior head. No laceration to R elbow, reporting mild tenderness, \"soreness.\"  Pt denies dizziness, N/V at this time. Pt placed on monitor. Call light within reach. Fall precautions in place. Denies Numbness and tingling. A&Ox4, following commands, speaking in clear sentences.   "

## 2025-04-07 ENCOUNTER — OFFICE VISIT (OUTPATIENT)
Dept: MEDICAL GROUP | Facility: MEDICAL CENTER | Age: 65
End: 2025-04-07
Payer: COMMERCIAL

## 2025-04-07 VITALS
SYSTOLIC BLOOD PRESSURE: 122 MMHG | WEIGHT: 260 LBS | HEIGHT: 72 IN | OXYGEN SATURATION: 96 % | TEMPERATURE: 97.5 F | BODY MASS INDEX: 35.21 KG/M2 | DIASTOLIC BLOOD PRESSURE: 70 MMHG | HEART RATE: 60 BPM

## 2025-04-07 DIAGNOSIS — R73.03 PREDIABETES: ICD-10-CM

## 2025-04-07 DIAGNOSIS — Z00.00 PE (PHYSICAL EXAM), ANNUAL: Primary | ICD-10-CM

## 2025-04-07 DIAGNOSIS — I10 PRIMARY HYPERTENSION: ICD-10-CM

## 2025-04-07 DIAGNOSIS — Z23 NEED FOR VACCINATION: ICD-10-CM

## 2025-04-07 DIAGNOSIS — G44.209 TENSION HEADACHE: ICD-10-CM

## 2025-04-07 DIAGNOSIS — J96.11 CHRONIC RESPIRATORY FAILURE WITH HYPOXIA (HCC): ICD-10-CM

## 2025-04-07 DIAGNOSIS — M17.0 PRIMARY OSTEOARTHRITIS OF BOTH KNEES: ICD-10-CM

## 2025-04-07 DIAGNOSIS — E78.2 MIXED HYPERLIPIDEMIA: ICD-10-CM

## 2025-04-07 DIAGNOSIS — E66.01 SEVERE OBESITY (BMI 35.0-39.9) WITH COMORBIDITY (HCC): ICD-10-CM

## 2025-04-07 DIAGNOSIS — G47.33 OSA ON CPAP: ICD-10-CM

## 2025-04-07 PROBLEM — M54.12 CERVICAL RADICULOPATHY: Status: RESOLVED | Noted: 2023-10-24 | Resolved: 2025-04-07

## 2025-04-07 PROBLEM — R51.9 FREQUENT HEADACHES: Status: RESOLVED | Noted: 2022-12-19 | Resolved: 2025-04-07

## 2025-04-07 PROCEDURE — 90471 IMMUNIZATION ADMIN: CPT | Performed by: FAMILY MEDICINE

## 2025-04-07 PROCEDURE — 99396 PREV VISIT EST AGE 40-64: CPT | Mod: 25 | Performed by: FAMILY MEDICINE

## 2025-04-07 PROCEDURE — 3074F SYST BP LT 130 MM HG: CPT | Performed by: FAMILY MEDICINE

## 2025-04-07 PROCEDURE — 3078F DIAST BP <80 MM HG: CPT | Performed by: FAMILY MEDICINE

## 2025-04-07 PROCEDURE — 90677 PCV20 VACCINE IM: CPT | Performed by: FAMILY MEDICINE

## 2025-04-07 PROCEDURE — 99214 OFFICE O/P EST MOD 30 MIN: CPT | Mod: 25 | Performed by: FAMILY MEDICINE

## 2025-04-07 ASSESSMENT — FIBROSIS 4 INDEX: FIB4 SCORE: 1.08

## 2025-04-07 ASSESSMENT — PATIENT HEALTH QUESTIONNAIRE - PHQ9: CLINICAL INTERPRETATION OF PHQ2 SCORE: 0

## 2025-04-07 NOTE — PROGRESS NOTES
Verbal consent was acquired by the patient to use [employer] ambient listening note generation during this visit: YES    CC: Bob, ANANTH follow up     Assessment & Plan:     1. Need for vaccination  - Pneumococcal Conjugate Vaccine 20-Valent (6 wks+)    2. Severe obesity (BMI 35.0-39.9) with comorbidity (HCC)  Chronic, lost 4 lbs since last office visit with dietary modification. He also started CPAP for ANANTH 3 months ago. He wishes to be referred to nutritional service for weight loss.   - Referral to Nutrition Services    3. Primary hypertension  Chronic, controlled with Lisinopril 10 mg qd, no s/e reported, will continue.      4. Tension headache  History and exam are concerning for tension headache on the right side, which has resolved with changes of pillow and better neck/head support at night. Will continue to monitor.     5. Mixed hyperlipidemia  Chronic, persistent, declined pharmacotherapy.   He wishes to continue to work on diet and lifestyle modification.   - Lipid Profile; Future  - Comp Metabolic Panel; Future  - CBC WITH DIFFERENTIAL; Future  - Referral to Nutrition Services    6. Prediabetes  Recent A1C was 6.1   Declined pharmacotherapy  Working on weight loss   - Dietary/lifestyle modification and weight loss    - HEMOGLOBIN A1C; Future  - Referral to Nutrition Services    7. PE (physical exam), annual (Primary)  Labs per orders  Immunization reviewed and discussed.  Patient was counseled about  diet, supplements, exercises.   Preventive cares reviewed, discussed, and updated as appropriate.       8. ANANTH on CPAP9. Chronic respiratory failure with hypoxia (HCC)  Patient was found to have severe sleep apnea with chronic respiratory failure and hypoxia. He tried CPAP for 3 months and still struggle to get used to it. He complains of inability to tolerate the mask when he sleeps on his sides. He endorses some improvement of energy and memory/cognitive symptoms during the day. He lost 4 lbs since last  office visit.   - encouraged patient to continue CPAP   - patient was advised to schedule appointment with respiratory therapist at [facility] for consultation.   - additional weight loss encouraged  - avoid alcohol, sedatives  - follow up in 3-6 months   - will gave overnight oxymetry study done once he uses CPAP consistently.     10. Primary osteoarthritis of both knees  Chronic, stable, will follow up with Orthopedics         Subjective:       HPI:     History of Present Illness  The patient is a 64-year-old male who presents for evaluation of sleep apnea, neck pain, anxiety, elevated blood glucose and cholesterol levels, knee pain, and head trauma.    He has been utilizing a nasal Continuous Positive Airway Pressure (CPAP) machine since 01/2025. He reports difficulty sleeping on his back due to neck pain that radiates to the corner of his head, resulting in headaches. He also experiences discomfort when the CPAP mask becomes entangled with his hair while sleeping on his side. His wife has observed a reduction in his snoring when he uses the CPAP machine. He typically goes to bed between 10:00 PM and 11:00 PM and wakes up at 6:00 AM. He occasionally removes the headgear due to irritation. He has not consulted with a respiratory therapist.     He lost about 4 lbs since last office visit. He has been consuming three scrambled eggs daily for breakfast and has eliminated toast, potatoes, and sausage from his diet in an attempt to lose weight. He has been unable to exercise due to knee pain. He will follow up with Orthopedics for chronic worsening bilateral knee osteoarthritis.     He used to experience headaches in the corner of his head, which felt like a hot burning sensation. A chiropractor adjusted his neck, which alleviated the headaches. Since using a new pillow, he has not had any headaches in the past two to three weeks.    Supplemental Information  He is deaf and uses hearing aids.    FAMILY HISTORY  He does not  have a family history of diabetes.    IMMUNIZATIONS  He is up to date with his tetanus vaccine as of 2024.      Current Outpatient Medications:   escitalopram (LEXAPRO) 20 MG tablet, TAKE 1 TABLET BY MOUTH EVERY DAY, Disp: 90 Tablet, Rfl: 0  lisinopril (PRINIVIL) 10 MG Tab, TAKE 1 TABLET BY MOUTH EVERY DAY, Disp: 90 Tablet, Rfl: 2  propranolol (INDERAL) 40 MG Tab, TAKE 1 TABLET BY MOUTH TWICE A DAY, Disp: 180 Tablet, Rfl: 3  meloxicam (MOBIC) 15 MG tablet, TAKE 1 TABLET BY MOUTH 1 TIME A DAY AS NEEDED FOR INFLAMMATION, SEVERE PAIN OR MODERATE PAIN (KNEE PAIN). WITH FOODS, Disp: 60 Tablet, Rfl: 0  vitamin D, Ergocalciferol, (DRISDOL) 89590 units Cap capsule, Take 50,000 Units by mouth every 7 days., Disp: , Rfl: 2     Objective:     Exam:  /70 (BP Location: Left arm, Patient Position: Sitting, BP Cuff Size: Large adult)   Pulse 60   Temp 36.4 °C (97.5 °F) (Temporal)   Ht 1.829 m (6')   Wt 118 kg (260 lb)   SpO2 96%   BMI 35.26 kg/m²  Body mass index is 35.26 kg/m².    Constitutional: awake, alert, in no distress.  Skin: Warm, dry, good turgor, no rashes, bruises, ulcers in visible areas.  Eye: conjunctiva clear, lids neg for edema or lesions.  ENMT: TM and auditory canals wnl. Oral and nasal mucosa wnl. Lips without lesions, good dentition, oropharynx clear.  Neck: Trachea midline, no masses, no thyromegaly. No cervical or supraclavicular lymphadenopathy  Respiratory: Unlabored respiratory effort, lungs clear to auscultation, no wheezes, no rales.  Cardiovascular: Normal S1, S2, no murmur, no pedal edema.  Psych: Oriented x3, affect and mood wnl, intact judgement and insight.         Return in about 6 months (around 10/7/2025) for Multiple issues, ANANTH .          Please note that this dictation was created using voice recognition software. I have made every reasonable attempt to correct obvious errors, but I expect that there are errors of grammar and possibly content that I did not discover before  finalizing the note.

## 2025-04-11 NOTE — Clinical Note
REFERRAL APPROVAL NOTICE         Sent on April 11, 2025                   Willem SUMMERS ERIK GREEN  [address]   [place] [address]                   Dear Mr. ERIK GREEN,    After a careful review of the medical information and benefit coverage, [facility] has processed your referral. See below for additional details.    If applicable, you must be actively enrolled with your insurance for coverage of the authorized service. If you have any questions regarding your coverage, please contact your insurance directly.    REFERRAL INFORMATION   Referral #:  [id number]  Referred-To Department    Referred-By Provider:  Cris Castro M.D.   [address]  [address]  [facility] [address]  [phone] [address] [facility] [id number] [phone]    Referral Start Date:  04/07/2025  Referral End Date:   04/07/2026             SCHEDULING  If you do not already have an appointment, please call [phone] to make an appointment.     MORE INFORMATION  If you do not already have a [employer] account, sign up at: [email or web address]  You can access your medical information, make appointments, see lab results, billing information, and more.  If you have questions regarding this referral, please contact  the [facility] Referrals department at:             [phone]. Monday - Friday 8:00AM - 5:00PM.     Sincerely,    [facility]

## 2025-06-10 DIAGNOSIS — F41.0 SEVERE ANXIETY WITH PANIC: ICD-10-CM

## 2025-06-12 RX ORDER — ESCITALOPRAM OXALATE 20 MG/1
20 TABLET ORAL
Qty: 90 TABLET | Refills: 3 | Status: SHIPPED | OUTPATIENT
Start: 2025-06-12

## 2025-09-23 ENCOUNTER — APPOINTMENT (OUTPATIENT)
Dept: MEDICAL GROUP | Facility: MEDICAL CENTER | Age: 65
End: 2025-09-23
Payer: COMMERCIAL